# Patient Record
Sex: MALE | Race: BLACK OR AFRICAN AMERICAN | NOT HISPANIC OR LATINO | Employment: UNEMPLOYED | URBAN - METROPOLITAN AREA
[De-identification: names, ages, dates, MRNs, and addresses within clinical notes are randomized per-mention and may not be internally consistent; named-entity substitution may affect disease eponyms.]

---

## 2019-04-18 ENCOUNTER — HOSPITAL ENCOUNTER (EMERGENCY)
Facility: HOSPITAL | Age: 34
Discharge: HOME | End: 2019-04-18
Attending: EMERGENCY MEDICINE | Admitting: EMERGENCY MEDICINE

## 2019-04-18 ENCOUNTER — APPOINTMENT (OUTPATIENT)
Dept: GENERAL RADIOLOGY | Facility: HOSPITAL | Age: 34
End: 2019-04-18

## 2019-04-18 VITALS
RESPIRATION RATE: 18 BRPM | SYSTOLIC BLOOD PRESSURE: 147 MMHG | DIASTOLIC BLOOD PRESSURE: 88 MMHG | WEIGHT: 215 LBS | TEMPERATURE: 97.7 F | OXYGEN SATURATION: 98 % | HEIGHT: 72 IN | HEART RATE: 75 BPM | BODY MASS INDEX: 29.12 KG/M2

## 2019-04-18 DIAGNOSIS — S80.812A ABRASION OF LEFT LOWER EXTREMITY, INITIAL ENCOUNTER: ICD-10-CM

## 2019-04-18 DIAGNOSIS — S40.012A CONTUSION OF LEFT SHOULDER, INITIAL ENCOUNTER: Primary | ICD-10-CM

## 2019-04-18 PROCEDURE — 73030 X-RAY EXAM OF SHOULDER: CPT

## 2019-04-18 PROCEDURE — 99282 EMERGENCY DEPT VISIT SF MDM: CPT

## 2019-04-18 RX ORDER — ALBUTEROL SULFATE 90 UG/1
2 AEROSOL, METERED RESPIRATORY (INHALATION) EVERY 4 HOURS PRN
COMMUNITY

## 2019-04-18 RX ORDER — NAPROXEN 500 MG/1
500 TABLET ORAL 2 TIMES DAILY WITH MEALS
Qty: 14 TABLET | Refills: 0 | Status: SHIPPED | OUTPATIENT
Start: 2019-04-18 | End: 2019-05-10 | Stop reason: HOSPADM

## 2019-05-07 ENCOUNTER — APPOINTMENT (OUTPATIENT)
Dept: GENERAL RADIOLOGY | Facility: HOSPITAL | Age: 34
End: 2019-05-07

## 2019-05-07 ENCOUNTER — HOSPITAL ENCOUNTER (EMERGENCY)
Facility: HOSPITAL | Age: 34
Discharge: HOME | End: 2019-05-07
Attending: EMERGENCY MEDICINE | Admitting: EMERGENCY MEDICINE

## 2019-05-07 VITALS
DIASTOLIC BLOOD PRESSURE: 104 MMHG | RESPIRATION RATE: 16 BRPM | SYSTOLIC BLOOD PRESSURE: 169 MMHG | HEIGHT: 72 IN | TEMPERATURE: 99.3 F | HEART RATE: 100 BPM | OXYGEN SATURATION: 98 % | BODY MASS INDEX: 29.8 KG/M2 | WEIGHT: 220 LBS

## 2019-05-07 DIAGNOSIS — S80.12XA CONTUSION OF LEFT LOWER EXTREMITY, INITIAL ENCOUNTER: ICD-10-CM

## 2019-05-07 DIAGNOSIS — S92.512A CLOSED DISPLACED FRACTURE OF PROXIMAL PHALANX OF LESSER TOE OF LEFT FOOT, INITIAL ENCOUNTER: Primary | ICD-10-CM

## 2019-05-07 PROCEDURE — 73630 X-RAY EXAM OF FOOT: CPT

## 2019-05-07 PROCEDURE — 99283 EMERGENCY DEPT VISIT LOW MDM: CPT

## 2019-05-07 PROCEDURE — 73590 X-RAY EXAM OF LOWER LEG: CPT

## 2019-05-07 RX ORDER — TRAMADOL HYDROCHLORIDE 50 MG/1
50 TABLET ORAL EVERY 6 HOURS PRN
Qty: 15 TABLET | Refills: 0 | Status: SHIPPED | OUTPATIENT
Start: 2019-05-07 | End: 2019-05-10 | Stop reason: HOSPADM

## 2019-05-07 NOTE — ED TRIAGE NOTES
"Pt reports \"I want a second opinion.\" \"I have a broken toe on my left foot and I want to get the process started.\" Pt reports he injured his toe at work in April.   "

## 2019-05-07 NOTE — ED PROVIDER NOTES
EMERGENCY DEPARTMENT ENCOUNTER    CHIEF COMPLAINT  Chief Complaint: toe pain  History given by: patient  History limited by: nothing  Room Number: 07/07  PMD: Provider, No Known      HPI:  Pt is a 34 y.o. male who presents complaining of left fifth toe pain that began 4/16/19 when Pt was in an accident at work. Pt states that he had to jump off a machine to avoid being crushed by a machine and he fell onto his left side. He has had pain in his toe ever since. He had an XR about a week later that showed that his toe was fractured. His swelling has not improved and he continues to have pain. He also continues to have pain over his anterior left shin from an injury that occurred around the same time. He has no other complaints at this time.     Duration: three weeks  Onset: sudden  Timing: constant  Location: left toe  Radiation: none  Quality: pain  Intensity/Severity: moderate  Progression: unchanged  Associated Symptoms: pain over anterior left shin  Aggravating Factors: movement, weight bearing  Alleviating Factors: positional rest  Previous Episodes: none  Treatment before arrival: none    PAST MEDICAL HISTORY  Active Ambulatory Problems     Diagnosis Date Noted   • No Active Ambulatory Problems     Resolved Ambulatory Problems     Diagnosis Date Noted   • No Resolved Ambulatory Problems     No Additional Past Medical History       PAST SURGICAL HISTORY  Past Surgical History:   Procedure Laterality Date   • SHOULDER ARTHROSCOPY Left        FAMILY HISTORY  History reviewed. No pertinent family history.    SOCIAL HISTORY  Social History     Socioeconomic History   • Marital status: Single     Spouse name: Not on file   • Number of children: Not on file   • Years of education: Not on file   • Highest education level: Not on file   Tobacco Use   • Smoking status: Current Some Day Smoker     Packs/day: 0.50     Types: Cigarettes   • Smokeless tobacco: Never Used   Substance and Sexual Activity   • Alcohol use: No      Frequency: Never   • Drug use: No   • Sexual activity: Defer       ALLERGIES  Patient has no known allergies.    REVIEW OF SYSTEMS  Review of Systems   Constitutional: Negative for fever.   Respiratory: Negative for shortness of breath.    Cardiovascular: Negative for chest pain.   Musculoskeletal: Positive for arthralgias (left fifth toe).        Pain over anterior left shin   All other systems reviewed and are negative.      PHYSICAL EXAM  ED Triage Vitals   Temp Heart Rate Resp BP SpO2   05/07/19 1400 05/07/19 1400 05/07/19 1400 05/07/19 1528 05/07/19 1400   99.3 °F (37.4 °C) 100 16 (!) 169/104 98 %      Temp src Heart Rate Source Patient Position BP Location FiO2 (%)   -- -- 05/07/19 1528 -- --     Sitting         Physical Exam   Constitutional: No distress.   HENT:   Head: Normocephalic and atraumatic.   Eyes: EOM are normal.   Neck: Normal range of motion.   Pulmonary/Chest: No respiratory distress.   CTAB   Abdominal: There is no tenderness.   Musculoskeletal: He exhibits no edema.   Tenderness and swelling over left anterior mid leg. Tenderness and swelling to left fifth toe.   Neurological: He is alert.   Skin: Skin is warm and dry.   Nursing note and vitals reviewed.        RADIOLOGY  XR Tibia Fibula 2 View Left   Final Result       Fracture of the fifth proximal phalanx.       This report was finalized on 5/7/2019 3:25 PM by Dr. Terry Lombardi M.D.          XR Foot 3+ View Left   Final Result       Fracture of the fifth proximal phalanx.       This report was finalized on 5/7/2019 3:25 PM by Dr. Terry Lombardi M.D.               I ordered the above noted radiological studies. Interpreted by radiologist.  Reviewed by me in PACS.       PROCEDURES  Procedures      PROGRESS AND CONSULTS     1450  Ordered XR left tib/fib and XR left foot for further evaluation.     1518  Rechecked Pt who is resting comfortably. Informed him that his left fifth toe is fractured and may require a pin but he will need  to follow up with an orthopedist. We will discharge him with a walking boot and crutches. Pt understands and agrees to all plans. All questions answered.       MEDICAL DECISION MAKING  Results were reviewed/discussed with the patient and they were also made aware of online access. Pt also made aware that some labs, such as cultures, will not be resulted during ER visit and follow up with PMD is necessary.     MDM  Number of Diagnoses or Management Options     Amount and/or Complexity of Data Reviewed  Tests in the radiology section of CPT®: ordered and reviewed (XR let tib/fib shows nothing acute. XR left foot shows a left fifth toe fracture.)           DIAGNOSIS  Final diagnoses:   Closed displaced fracture of proximal phalanx of lesser toe of left foot, initial encounter   Contusion of left lower extremity, initial encounter       DISPOSITION  DISCHARGE    Patient discharged in stable condition.    Reviewed implications of results, diagnosis, meds, responsibility to follow up, warning signs and symptoms of possible worsening, potential complications and reasons to return to ER, including new or worsening symptoms.    Patient/Family voiced understanding of above instructions.    Discussed plan for discharge, as there is no emergent indication for admission. Patient referred to primary care provider for BP management due to today's BP. Pt/family is agreeable and understands need for follow up and repeat testing.  Pt is aware that discharge does not mean that nothing is wrong but it indicates no emergency is present that requires admission and they must continue care with follow-up as given below or physician of their choice.     FOLLOW-UP  Dee Stewart MD  [address] [address] [phone]    Schedule an appointment as soon as possible for a visit            Medication List      No changes were made to your prescriptions during this visit.           Latest Documented Vital Signs:  As  of 3:30 PM  BP- (!) 169/104 HR- 100 Temp- 99.3 °F (37.4 °C) O2 sat- 98%    --  Documentation assistance provided by bulmaro Villatoro for Dr. Gramajo.  Information recorded by the scribe was done at my direction and has been verified and validated by me.     Teri Villatoro  05/07/19 [id number]       Edgar Gramajo MD  05/07/19 9514

## 2019-05-07 NOTE — DISCHARGE INSTRUCTIONS
Ice and elevate your foot when at rest.  You do need to see an orthopedist so they can determine whether or not you need surgery.  Please call Dr. Stewart for an appointment.  Use over-the-counter Tylenol or ibuprofen as needed for pain.

## 2019-05-09 ENCOUNTER — TELEPHONE (OUTPATIENT)
Dept: ORTHOPEDIC SURGERY | Facility: CLINIC | Age: 34
End: 2019-05-09

## 2019-05-09 ENCOUNTER — OFFICE VISIT (OUTPATIENT)
Dept: ORTHOPEDIC SURGERY | Facility: CLINIC | Age: 34
End: 2019-05-09

## 2019-05-09 VITALS — BODY MASS INDEX: 29.8 KG/M2 | HEIGHT: 72 IN | WEIGHT: 220 LBS | TEMPERATURE: 98.2 F

## 2019-05-09 DIAGNOSIS — S92.512A CLOSED DISPLACED FRACTURE OF PROXIMAL PHALANX OF LESSER TOE OF LEFT FOOT, INITIAL ENCOUNTER: Primary | ICD-10-CM

## 2019-05-09 DIAGNOSIS — S99.922A TOE INJURY, LEFT, INITIAL ENCOUNTER: ICD-10-CM

## 2019-05-09 DIAGNOSIS — Z72.0 TOBACCO ABUSE: ICD-10-CM

## 2019-05-09 PROCEDURE — 99406 BEHAV CHNG SMOKING 3-10 MIN: CPT | Performed by: ORTHOPAEDIC SURGERY

## 2019-05-09 PROCEDURE — 73660 X-RAY EXAM OF TOE(S): CPT | Performed by: ORTHOPAEDIC SURGERY

## 2019-05-09 PROCEDURE — 99204 OFFICE O/P NEW MOD 45 MIN: CPT | Performed by: ORTHOPAEDIC SURGERY

## 2019-05-09 NOTE — H&P
"   Patient: Bronson Phillips  YOB: 1985 34 y.o. male  Medical Record Number: [id number]     Chief Complaints: I hurt my toe     History of Present Illness: Patient injured his left fifth toe on 4/26/2019 when he was at work and had to jump off of some type of machine to keep from \"being crushed.     He was seen and has been followed elsewhere for his left shoulder and left fifth toe but was told he may need surgery for the foot and was waiting on some type of referral says that he became \"frustrated\" and went to the ER for further evaluation.  He was seen and examined there and referred here today for further evaluation.       He reports a severe constant crushing stabbing aching pain with redness bruising and swelling in his left fifth toe worse with standing driving and walking improved with ice.  He has been using the boot but no other assistive device.        HPI     Allergies: No Known Allergies     Medications:        Current Outpatient Medications on File Prior to Visit   Medication Sig   • albuterol sulfate  (90 Base) MCG/ACT inhaler Inhale 2 puffs Every 4 (Four) Hours As Needed for Wheezing.   • fluticasone-salmeterol (ADVAIR HFA) 115-21 MCG/ACT inhaler Inhale 2 puffs 2 (Two) Times a Day.   • naproxen (EC NAPROSYN) 500 MG EC tablet Take 1 tablet by mouth 2 (Two) Times a Day With Meals.   • traMADol (ULTRAM) 50 MG tablet Take 1 tablet by mouth Every 6 (Six) Hours As Needed for Moderate Pain .      No current facility-administered medications on file prior to visit.          Medical History   History reviewed. No pertinent past medical history.     Surgical History         Past Surgical History:   Procedure Laterality Date   • SHOULDER ARTHROSCOPY Left           Social History      Occupational History   • Not on file   Tobacco Use   • Smoking status: Current Some Day Smoker       Packs/day: 0.50       Types: Cigarettes   • Smokeless tobacco: Never Used   Substance and Sexual Activity   • " "Alcohol use: No       Frequency: Never   • Drug use: No   • Sexual activity: Defer      Social History          Social History Narrative   • Not on file      History reviewed. No pertinent family history.     Review of Systems: 14 point review of systems performed, positive pertinent findings identified in HPI. All remaining systems negative except wheezing, numbness and tingling, mood swings, anxiety depression, difficulty sleeping     Review of Systems        Physical Exam:   Vitals       Vitals:     05/09/19 0903   Temp: 98.2 °F (36.8 °C)   Weight: 99.8 kg (220 lb)   Height: 182.9 cm (72\")         Physical Exam   Constitutional: pleasant, well developed   Eyes: sclera non icteric  Hearing : adequate for exam  Cardiovascular: palpable pulses in left foot, left calf/ thigh NT without sign of DVT  Respiratoy: breathing unlabored   Neurological: grossly sensate to LT throughout left LE  Psychiatric: oriented with normal mood and affect.   Lymphatic: No palpable popliteal lymphadenopathy left LE  Skin: intact throughout left leg/foot  Musculoskeletal: Left foot shows mild to moderate swelling of the left fifth toe with some palpable prominence dorsally with exquisite tenderness to palpation.  There is brisk capillary refill to the toe but it was grossly sensate to light touch.  He is nontender over the ankle and was ablating in a boot without assistive device.  Physical Exam  Ortho Exam     Radiology: 3 views of the left fifth toe ordered to evaluate pain reviewed and compared to x-rays on the [facility] system from 5/7/2019.  There is a transverse slightly oblique fracture through the midportion of the left fifth proximal phalanx.  There is not appreciable angulation but there is dorsal translation of about 80% the diameter of the phalanx.     Assessment/Plan: 1.  Displaced fracture left fifth toe proximal phalanx.     Reviewed with him operative and nonoperative treatment options and given the fact that is been about 3 " and half weeks since his injury he has not had any improvement in pain he would like to proceed with operative treatment although no guarantees could be given.  Reviewed with him that this could potentially heal without surgery but given the amount of translation there may be persistent residual deformity or it may not heal.  Certainly could not guarantee with surgery that he would be pain-free and reviewed with him that given the length of time since this injury that this would require open treatment rather than closed and percutaneous pinning.     I reviewed the operative procedure and postoperative course with him and that he will need to be limited in weightbearing with at least use of a cane in the right hand as he cannot use crutches because of some type of injury in his left shoulder that he is being seen elsewhere for.     Risk benefits potential outcomes and complications were reviewed and can include but not limited to heart attack stroke death pneumonia infection bleeding damage to blood vessels nerves or tendons blood clots pulmonary embolism persistent or worsening pain stiffness malunion nonunion and loss of the toe.  All his questions answered to his full satisfaction we will plan to proceed with this on outpatient basis tomorrow.     Regarding persistent habitual smoking.  I have discussed for at least 3 minutes with the patient the ill effects of continued smoking on pulmonary and cardiovascular health as well as financial burden.  I also discussed at length the effects on peripheral circulation as well as inhibition of soft tissue and bone healing.  I've recommended that they speak with their primary care physician regarding cessation techniques.        This will be done under his regular insurance although there is a Workmen's Compensation claim it was investigated by our office staff and the claim is being denied and patient has been terminated.  He was made aware of all this during his  visit

## 2019-05-10 ENCOUNTER — HOSPITAL ENCOUNTER (OUTPATIENT)
Facility: HOSPITAL | Age: 34
Setting detail: HOSPITAL OUTPATIENT SURGERY
Discharge: HOME | End: 2019-05-10
Attending: ORTHOPAEDIC SURGERY | Admitting: ORTHOPAEDIC SURGERY

## 2019-05-10 ENCOUNTER — ANESTHESIA (OUTPATIENT)
Dept: PERIOP | Facility: HOSPITAL | Age: 34
End: 2019-05-10

## 2019-05-10 ENCOUNTER — APPOINTMENT (OUTPATIENT)
Dept: GENERAL RADIOLOGY | Facility: HOSPITAL | Age: 34
End: 2019-05-10

## 2019-05-10 ENCOUNTER — ANESTHESIA EVENT (OUTPATIENT)
Dept: PERIOP | Facility: HOSPITAL | Age: 34
End: 2019-05-10

## 2019-05-10 VITALS
SYSTOLIC BLOOD PRESSURE: 151 MMHG | RESPIRATION RATE: 18 BRPM | HEART RATE: 88 BPM | DIASTOLIC BLOOD PRESSURE: 95 MMHG | TEMPERATURE: 97.9 F | OXYGEN SATURATION: 96 %

## 2019-05-10 DIAGNOSIS — S92.512A CLOSED DISPLACED FRACTURE OF PROXIMAL PHALANX OF LESSER TOE OF LEFT FOOT, INITIAL ENCOUNTER: ICD-10-CM

## 2019-05-10 PROCEDURE — 76000 FLUOROSCOPY <1 HR PHYS/QHP: CPT

## 2019-05-10 PROCEDURE — 25010000002 FENTANYL CITRATE (PF) 100 MCG/2ML SOLUTION: Performed by: NURSE ANESTHETIST, CERTIFIED REGISTERED

## 2019-05-10 PROCEDURE — 25010000002 HYDRALAZINE PER 20 MG: Performed by: ANESTHESIOLOGY

## 2019-05-10 PROCEDURE — 25010000002 FENTANYL CITRATE (PF) 100 MCG/2ML SOLUTION: Performed by: ANESTHESIOLOGY

## 2019-05-10 PROCEDURE — 25010000002 DEXAMETHASONE PER 1 MG: Performed by: NURSE ANESTHETIST, CERTIFIED REGISTERED

## 2019-05-10 PROCEDURE — 73620 X-RAY EXAM OF FOOT: CPT

## 2019-05-10 PROCEDURE — 94640 AIRWAY INHALATION TREATMENT: CPT

## 2019-05-10 PROCEDURE — 28525 TREAT TOE FRACTURE: CPT | Performed by: ORTHOPAEDIC SURGERY

## 2019-05-10 PROCEDURE — 94799 UNLISTED PULMONARY SVC/PX: CPT

## 2019-05-10 PROCEDURE — C1713 ANCHOR/SCREW BN/BN,TIS/BN: HCPCS | Performed by: ORTHOPAEDIC SURGERY

## 2019-05-10 PROCEDURE — 25010000002 ONDANSETRON PER 1 MG: Performed by: NURSE ANESTHETIST, CERTIFIED REGISTERED

## 2019-05-10 PROCEDURE — 25010000002 MIDAZOLAM PER 1 MG: Performed by: ANESTHESIOLOGY

## 2019-05-10 PROCEDURE — 25010000003 CEFAZOLIN IN DEXTROSE 2-4 GM/100ML-% SOLUTION: Performed by: ORTHOPAEDIC SURGERY

## 2019-05-10 PROCEDURE — 25010000002 PROPOFOL 10 MG/ML EMULSION: Performed by: NURSE ANESTHETIST, CERTIFIED REGISTERED

## 2019-05-10 PROCEDURE — 25010000002 KETOROLAC TROMETHAMINE PER 15 MG: Performed by: NURSE ANESTHETIST, CERTIFIED REGISTERED

## 2019-05-10 DEVICE — KWIRE SMOTH DBL/TROC .062X4IN: Type: IMPLANTABLE DEVICE | Site: TOE FIFTH | Status: FUNCTIONAL

## 2019-05-10 RX ORDER — OXYCODONE HYDROCHLORIDE AND ACETAMINOPHEN 5; 325 MG/1; MG/1
1-2 TABLET ORAL EVERY 6 HOURS PRN
Qty: 50 TABLET | Refills: 0 | OUTPATIENT
Start: 2019-05-10 | End: 2019-08-23

## 2019-05-10 RX ORDER — ACETAMINOPHEN 650 MG
TABLET, EXTENDED RELEASE ORAL AS NEEDED
Status: DISCONTINUED | OUTPATIENT
Start: 2019-05-10 | End: 2019-05-10 | Stop reason: HOSPADM

## 2019-05-10 RX ORDER — MAGNESIUM HYDROXIDE 1200 MG/15ML
LIQUID ORAL AS NEEDED
Status: DISCONTINUED | OUTPATIENT
Start: 2019-05-10 | End: 2019-05-10 | Stop reason: HOSPADM

## 2019-05-10 RX ORDER — ALBUTEROL SULFATE 2.5 MG/3ML
2.5 SOLUTION RESPIRATORY (INHALATION) ONCE
Status: COMPLETED | OUTPATIENT
Start: 2019-05-10 | End: 2019-05-10

## 2019-05-10 RX ORDER — NALBUPHINE HCL 10 MG/ML
10 AMPUL (ML) INJECTION EVERY 4 HOURS PRN
Status: DISCONTINUED | OUTPATIENT
Start: 2019-05-10 | End: 2019-05-10 | Stop reason: HOSPADM

## 2019-05-10 RX ORDER — OXYCODONE HYDROCHLORIDE AND ACETAMINOPHEN 5; 325 MG/1; MG/1
1 TABLET ORAL ONCE AS NEEDED
Status: DISCONTINUED | OUTPATIENT
Start: 2019-05-10 | End: 2019-05-10 | Stop reason: HOSPADM

## 2019-05-10 RX ORDER — ACETAMINOPHEN 650 MG/1
650 SUPPOSITORY RECTAL ONCE AS NEEDED
Status: DISCONTINUED | OUTPATIENT
Start: 2019-05-10 | End: 2019-05-10 | Stop reason: HOSPADM

## 2019-05-10 RX ORDER — PROMETHAZINE HYDROCHLORIDE 25 MG/1
25 TABLET ORAL ONCE AS NEEDED
Status: DISCONTINUED | OUTPATIENT
Start: 2019-05-10 | End: 2019-05-10 | Stop reason: HOSPADM

## 2019-05-10 RX ORDER — LIDOCAINE HYDROCHLORIDE 10 MG/ML
0.5 INJECTION, SOLUTION EPIDURAL; INFILTRATION; INTRACAUDAL; PERINEURAL ONCE AS NEEDED
Status: DISCONTINUED | OUTPATIENT
Start: 2019-05-10 | End: 2019-05-10 | Stop reason: HOSPADM

## 2019-05-10 RX ORDER — HYDROCODONE BITARTRATE AND ACETAMINOPHEN 5; 325 MG/1; MG/1
1 TABLET ORAL ONCE AS NEEDED
Status: DISCONTINUED | OUTPATIENT
Start: 2019-05-10 | End: 2019-05-10 | Stop reason: HOSPADM

## 2019-05-10 RX ORDER — CEPHALEXIN 500 MG/1
500 CAPSULE ORAL EVERY 6 HOURS
Qty: 8 CAPSULE | Refills: 0 | Status: SHIPPED | OUTPATIENT
Start: 2019-05-10 | End: 2019-05-12

## 2019-05-10 RX ORDER — MIDAZOLAM HYDROCHLORIDE 1 MG/ML
2 INJECTION INTRAMUSCULAR; INTRAVENOUS
Status: DISCONTINUED | OUTPATIENT
Start: 2019-05-10 | End: 2019-05-10 | Stop reason: HOSPADM

## 2019-05-10 RX ORDER — MIDAZOLAM HYDROCHLORIDE 1 MG/ML
1 INJECTION INTRAMUSCULAR; INTRAVENOUS
Status: DISCONTINUED | OUTPATIENT
Start: 2019-05-10 | End: 2019-05-10 | Stop reason: HOSPADM

## 2019-05-10 RX ORDER — ACETAMINOPHEN 500 MG
500 TABLET ORAL ONCE
Status: COMPLETED | OUTPATIENT
Start: 2019-05-10 | End: 2019-05-10

## 2019-05-10 RX ORDER — NALOXONE HCL 0.4 MG/ML
0.4 VIAL (ML) INJECTION AS NEEDED
Status: DISCONTINUED | OUTPATIENT
Start: 2019-05-10 | End: 2019-05-10 | Stop reason: HOSPADM

## 2019-05-10 RX ORDER — DIPHENHYDRAMINE HYDROCHLORIDE 50 MG/ML
12.5 INJECTION INTRAMUSCULAR; INTRAVENOUS
Status: DISCONTINUED | OUTPATIENT
Start: 2019-05-10 | End: 2019-05-10 | Stop reason: HOSPADM

## 2019-05-10 RX ORDER — SODIUM CHLORIDE 0.9 % (FLUSH) 0.9 %
3 SYRINGE (ML) INJECTION EVERY 12 HOURS SCHEDULED
Status: DISCONTINUED | OUTPATIENT
Start: 2019-05-10 | End: 2019-05-10 | Stop reason: HOSPADM

## 2019-05-10 RX ORDER — ACETAMINOPHEN 325 MG/1
650 TABLET ORAL ONCE AS NEEDED
Status: DISCONTINUED | OUTPATIENT
Start: 2019-05-10 | End: 2019-05-10 | Stop reason: HOSPADM

## 2019-05-10 RX ORDER — CEFAZOLIN SODIUM 2 G/100ML
2 INJECTION, SOLUTION INTRAVENOUS ONCE
Status: COMPLETED | OUTPATIENT
Start: 2019-05-10 | End: 2019-05-10

## 2019-05-10 RX ORDER — PROMETHAZINE HYDROCHLORIDE 25 MG/ML
6.25 INJECTION, SOLUTION INTRAMUSCULAR; INTRAVENOUS ONCE AS NEEDED
Status: DISCONTINUED | OUTPATIENT
Start: 2019-05-10 | End: 2019-05-10 | Stop reason: HOSPADM

## 2019-05-10 RX ORDER — FENTANYL CITRATE 50 UG/ML
INJECTION, SOLUTION INTRAMUSCULAR; INTRAVENOUS AS NEEDED
Status: DISCONTINUED | OUTPATIENT
Start: 2019-05-10 | End: 2019-05-10 | Stop reason: SURG

## 2019-05-10 RX ORDER — ONDANSETRON 2 MG/ML
INJECTION INTRAMUSCULAR; INTRAVENOUS AS NEEDED
Status: DISCONTINUED | OUTPATIENT
Start: 2019-05-10 | End: 2019-05-10 | Stop reason: SURG

## 2019-05-10 RX ORDER — NALBUPHINE HCL 10 MG/ML
2 AMPUL (ML) INJECTION EVERY 4 HOURS PRN
Status: DISCONTINUED | OUTPATIENT
Start: 2019-05-10 | End: 2019-05-10 | Stop reason: HOSPADM

## 2019-05-10 RX ORDER — ACETAMINOPHEN 500 MG
500 TABLET ORAL ONCE
Status: DISCONTINUED | OUTPATIENT
Start: 2019-05-10 | End: 2019-05-10 | Stop reason: HOSPADM

## 2019-05-10 RX ORDER — SODIUM CHLORIDE 0.9 % (FLUSH) 0.9 %
1-10 SYRINGE (ML) INJECTION AS NEEDED
Status: DISCONTINUED | OUTPATIENT
Start: 2019-05-10 | End: 2019-05-10 | Stop reason: HOSPADM

## 2019-05-10 RX ORDER — BUPIVACAINE HYDROCHLORIDE 5 MG/ML
INJECTION, SOLUTION EPIDURAL; INTRACAUDAL AS NEEDED
Status: DISCONTINUED | OUTPATIENT
Start: 2019-05-10 | End: 2019-05-10 | Stop reason: HOSPADM

## 2019-05-10 RX ORDER — PROMETHAZINE HYDROCHLORIDE 25 MG/1
25 SUPPOSITORY RECTAL ONCE AS NEEDED
Status: DISCONTINUED | OUTPATIENT
Start: 2019-05-10 | End: 2019-05-10 | Stop reason: HOSPADM

## 2019-05-10 RX ORDER — KETOROLAC TROMETHAMINE 30 MG/ML
INJECTION, SOLUTION INTRAMUSCULAR; INTRAVENOUS AS NEEDED
Status: DISCONTINUED | OUTPATIENT
Start: 2019-05-10 | End: 2019-05-10 | Stop reason: SURG

## 2019-05-10 RX ORDER — FENTANYL CITRATE 50 UG/ML
50 INJECTION, SOLUTION INTRAMUSCULAR; INTRAVENOUS
Status: DISCONTINUED | OUTPATIENT
Start: 2019-05-10 | End: 2019-05-10 | Stop reason: HOSPADM

## 2019-05-10 RX ORDER — SODIUM CHLORIDE, SODIUM LACTATE, POTASSIUM CHLORIDE, CALCIUM CHLORIDE 600; 310; 30; 20 MG/100ML; MG/100ML; MG/100ML; MG/100ML
9 INJECTION, SOLUTION INTRAVENOUS CONTINUOUS
Status: DISCONTINUED | OUTPATIENT
Start: 2019-05-10 | End: 2019-05-10 | Stop reason: HOSPADM

## 2019-05-10 RX ORDER — HYDROMORPHONE HYDROCHLORIDE 1 MG/ML
0.5 INJECTION, SOLUTION INTRAMUSCULAR; INTRAVENOUS; SUBCUTANEOUS
Status: DISCONTINUED | OUTPATIENT
Start: 2019-05-10 | End: 2019-05-10 | Stop reason: HOSPADM

## 2019-05-10 RX ORDER — LIDOCAINE HYDROCHLORIDE 20 MG/ML
INJECTION, SOLUTION INFILTRATION; PERINEURAL AS NEEDED
Status: DISCONTINUED | OUTPATIENT
Start: 2019-05-10 | End: 2019-05-10 | Stop reason: SURG

## 2019-05-10 RX ORDER — HYDRALAZINE HYDROCHLORIDE 20 MG/ML
5 INJECTION INTRAMUSCULAR; INTRAVENOUS
Status: DISCONTINUED | OUTPATIENT
Start: 2019-05-10 | End: 2019-05-10 | Stop reason: HOSPADM

## 2019-05-10 RX ORDER — PROPOFOL 10 MG/ML
VIAL (ML) INTRAVENOUS AS NEEDED
Status: DISCONTINUED | OUTPATIENT
Start: 2019-05-10 | End: 2019-05-10 | Stop reason: SURG

## 2019-05-10 RX ORDER — DEXAMETHASONE SODIUM PHOSPHATE 10 MG/ML
INJECTION INTRAMUSCULAR; INTRAVENOUS AS NEEDED
Status: DISCONTINUED | OUTPATIENT
Start: 2019-05-10 | End: 2019-05-10 | Stop reason: SURG

## 2019-05-10 RX ADMIN — FENTANYL CITRATE 50 MCG: 50 INJECTION INTRAMUSCULAR; INTRAVENOUS at 12:43

## 2019-05-10 RX ADMIN — LIDOCAINE HYDROCHLORIDE 100 MG: 20 INJECTION, SOLUTION INFILTRATION; PERINEURAL at 12:12

## 2019-05-10 RX ADMIN — SODIUM CHLORIDE, POTASSIUM CHLORIDE, SODIUM LACTATE AND CALCIUM CHLORIDE: 600; 310; 30; 20 INJECTION, SOLUTION INTRAVENOUS at 13:12

## 2019-05-10 RX ADMIN — OXYCODONE AND ACETAMINOPHEN 1 TABLET: 5; 325 TABLET ORAL at 14:05

## 2019-05-10 RX ADMIN — FENTANYL CITRATE 50 MCG: 50 INJECTION INTRAMUSCULAR; INTRAVENOUS at 12:46

## 2019-05-10 RX ADMIN — FENTANYL CITRATE 100 MCG: 50 INJECTION INTRAMUSCULAR; INTRAVENOUS at 12:12

## 2019-05-10 RX ADMIN — DEXAMETHASONE SODIUM PHOSPHATE 8 MG: 10 INJECTION INTRAMUSCULAR; INTRAVENOUS at 12:20

## 2019-05-10 RX ADMIN — ONDANSETRON 4 MG: 2 INJECTION INTRAMUSCULAR; INTRAVENOUS at 12:56

## 2019-05-10 RX ADMIN — HYDRALAZINE HYDROCHLORIDE 5 MG: 20 INJECTION INTRAMUSCULAR; INTRAVENOUS at 13:47

## 2019-05-10 RX ADMIN — FENTANYL CITRATE 50 MCG: 50 INJECTION, SOLUTION INTRAMUSCULAR; INTRAVENOUS at 14:04

## 2019-05-10 RX ADMIN — KETOROLAC TROMETHAMINE 30 MG: 30 INJECTION, SOLUTION INTRAMUSCULAR; INTRAVENOUS at 13:12

## 2019-05-10 RX ADMIN — CEFAZOLIN SODIUM 2 G: 2 INJECTION, SOLUTION INTRAVENOUS at 12:15

## 2019-05-10 RX ADMIN — HYDRALAZINE HYDROCHLORIDE 10 MG: 20 INJECTION INTRAMUSCULAR; INTRAVENOUS at 13:37

## 2019-05-10 RX ADMIN — FENTANYL CITRATE 50 MCG: 50 INJECTION, SOLUTION INTRAMUSCULAR; INTRAVENOUS at 13:46

## 2019-05-10 RX ADMIN — ALBUTEROL SULFATE 2.5 MG: 2.5 SOLUTION RESPIRATORY (INHALATION) at 11:29

## 2019-05-10 RX ADMIN — SODIUM CHLORIDE, POTASSIUM CHLORIDE, SODIUM LACTATE AND CALCIUM CHLORIDE: 600; 310; 30; 20 INJECTION, SOLUTION INTRAVENOUS at 11:53

## 2019-05-10 RX ADMIN — MIDAZOLAM 2 MG: 1 INJECTION INTRAMUSCULAR; INTRAVENOUS at 11:37

## 2019-05-10 RX ADMIN — HYDRALAZINE HYDROCHLORIDE 5 MG: 20 INJECTION INTRAMUSCULAR; INTRAVENOUS at 13:56

## 2019-05-10 RX ADMIN — PROPOFOL 200 MG: 10 INJECTION, EMULSION INTRAVENOUS at 12:12

## 2019-05-10 RX ADMIN — ACETAMINOPHEN 500 MG: 500 TABLET, FILM COATED ORAL at 11:36

## 2019-05-10 NOTE — NURSING NOTE
Patient wanting out of hospital.  No distress noted.  No problems noted with dressing.  Post op shoe on and pin wnl.  Wheeled directly to the car and assisted in car without bearing weight.

## 2019-05-10 NOTE — BRIEF OP NOTE
TOE OPEN REDUCTION INTERNAL FIXATION  Progress Note    Bronson Phillips  5/10/2019    Pre-op Diagnosis:   Closed displaced fracture of proximal phalanx of lesser toe of left foot, initial encounter [S92.512A]       Post-Op Diagnosis Codes:     * Closed displaced fracture of proximal phalanx of lesser toe of left foot, initial encounter [S92.512A]    Procedure/CPT® Codes:      Procedure(s):  left fifthTOE OPEN REDUCTION INTERNAL FIXATION    Surgeon(s):  Angel Castillo MD    Anesthesia: General    Staff:   Circulator: Mary Moses RN  Radiology Technologist: Angel Ch  Scrub Person: Nellie Barreto    Estimated Blood Loss: minimal    Urine Voided: * No values recorded between 5/10/2019 12:05 PM and 5/10/2019  1:18 PM *    Specimens:                None    TT 37min      Drains: none     Findings: see dict    Complications: none      Angel Castillo MD     Date: 5/10/2019  Time: 1:19 PM

## 2019-05-10 NOTE — ANESTHESIA PROCEDURE NOTES
Airway  Urgency: elective    Airway not difficult    General Information and Staff    Patient location during procedure: OR  Anesthesiologist: Nick Rocha MD  CRNA: Jennifer Mcdermott CRNA    Indications and Patient Condition  Indications for airway management: airway protection    Preoxygenated: yes  MILS not maintained throughout  Mask difficulty assessment: 1 - vent by mask    Final Airway Details  Final airway type: supraglottic airway      Successful airway: classic  Size 5    Number of attempts at approach: 1    Additional Comments  Inflated to seal.

## 2019-05-10 NOTE — OP NOTE
Operative Note      Facility: [facility]  Patient Name: Bronson Phillips  YOB: 1985  Date: 5/10/2019  Medical Record Number: [id number]      Pre-op Diagnosis: 1.  Left fifth toe displaced transverse midshaft proximal phalangeal fracture      Post-op Diagnosis:   1.  Left fifth toe displaced transverse midshaft proximal phalangeal fracture        Procedure(s):  left fifthTOE OPEN REDUCTION INTERNAL FIXATION    Surgeon(s):  Angel Castillo MD    Anesthesia: General  Anesthesiologist: Nick Rocha MD  CRNA: Jennifer Mcdermott CRNA    Staff:   Circulator: Mary Moses RN  Radiology Technologist: Angel Ch  Scrub Person: Nellie Barreto  Assistants : none      Tourniquet time: 37 minutes        Estimated Blood Loss:  minimal  Drains: None  Specimens: * No orders in the log *  Findings: See Dictation    Complications: None      Indications for Procedure: Patient injured his left fifth toe on 4/16/2019 when he had a jump from some type of machinery to keep from being injured.  He has been seen and followed elsewhere and I initially saw him yesterday with persistent complaints of severe constant pain in the left fifth toe.  X-rays show significant displacement and recommended operative fixation.  He voiced a clear understanding of operative and nonoperative treatment options with decision made to proceed with operative treatment.  He voiced a clear understanding of risks benefits potential outcomes and complications as reviewed previously        Description of Procedure: Preoperative informed consent and anesthesia evaluation were obtained.  IV antibiotics were administered and the surgical site was marked.  Patient was brought to the operating room placed in the supine position.  Anesthesia was induced and LMA was positioned.  Well-padded tourniquet was placed left distal leg.  Left foot was prepped and draped in a sterile fashion and surgical timeout was  performed.    Left fifth toe was examined and manipulated radiographically with inability to reduce this in a closed fashion which I suspected would be the case given the time since his injury.    Left foot was exsanguinated and pneumatic tourniquet inflated to 200 mmHg.  The fracture was localized radiographically and incision was made over the proximal phalanx of the fifth toe.  Full-thickness flaps were elevated and the extensor tendon was identified.  It was adherent to the underlying bone with minimal excursion indicative of previous scarring from injury.  I decided it was best to split this in order to identify the fracture had adequately.  The tendon was divided longitudinally but kept intact both proximally and distally.  Full-thickness flaps were elevated and the proximal phalanx was exposed.  There was fibrotic material surrounding the fracture but no clear callus.  There was slight comminution over the dorsal proximal aspect of the distal fragment.  The bone had been shortened and soft tissues were contracted around this.  These were gently released and I was then able to use a rondure to clear fibrotic material from within the fracture beds.  Once these were better mobilized I then distracted and reduced the fracture and appeared to have adequate reduction of the difficult to key in anywhere due to comminution and time since injury.  The wound was irrigated with dilute Betadine solution and I then felt that a K wire was best to fix this given the size of the fragments.  A 0.062 mm K wire was passed out through the base of the distal fracture and the intramedullary canal out through the tip of the toe.  The fracture was then reduced and cross pinned.  Initially there was still too much flexion and the pin was backed out fractures were again mobilized and then again reduced and K wire was passed to the intramedullary canal of the proximal fragment and embedded proximally.  X-rays showed improved alignment  that was acceptable and there was good opposition of the fracture medially laterally and plantarly.  The area where there is been some comminution was not quite well opposed but this was unavoidable.  Overall there was a stable construct and no gross instability rotationally or with varus and valgus stress.  X-rays confirmed appropriate alignment and containment of the hardware.    The wound was again irrigated copiously with dilute Betadine solution the extensor tendon was then repaired with 4-0 Vicryl suture.  The tourniquet was released and hemostasis was obtained.  There was prompt return of capillary refill to the toes and the skin was closed with 4-0 Vicryl.  A regional field block was administered around the fifth toe proximally with a total of 8 cc 1/2% Marcaine without epinephrine.    Sterile dressings were applied after the pin was bent clipped and capped.  Forefoot and ankle bunion dressing was applied with gentle compression.  Postoperative shoe was placed.    He was awakened transferred to stretcher and taken recovery in stable condition

## 2019-05-10 NOTE — ANESTHESIA PREPROCEDURE EVALUATION
Anesthesia Evaluation     no history of anesthetic complications:  NPO Solid Status: > 8 hours             Airway   Mallampati: II  TM distance: >3 FB  Neck ROM: full  No difficulty expected  Dental - normal exam     Pulmonary - negative pulmonary ROS   (+) wheezes,     PE comment: R>L end expiratory wheezes  Cardiovascular - negative cardio ROS    Rhythm: regular    (-) murmur      Neuro/Psych- negative ROS  GI/Hepatic/Renal/Endo - negative ROS     Musculoskeletal (-) negative ROS    Abdominal    Substance History      OB/GYN          Other                        Anesthesia Plan    ASA 2     general   (  D/W R&B of GA including but not limited to: heart, lung, liver, kidney, neurologic problems, positioning injuries, dental damage, corneal abrasion and TMJ.  .    Pt wants GA)  Anesthetic plan, all risks, benefits, and alternatives have been provided, discussed and informed consent has been obtained with: patient.

## 2019-05-10 NOTE — ANESTHESIA POSTPROCEDURE EVALUATION
Patient: Bronson Phillips    Procedure Summary     Date:  05/10/19 Room / Location:   [facility]  [facility]    Anesthesia Start:  1205 Anesthesia Stop:  1332    Procedure:  left fifthTOE OPEN REDUCTION INTERNAL FIXATION (Left Toes) Diagnosis:       Closed displaced fracture of proximal phalanx of lesser toe of left foot, initial encounter      (Closed displaced fracture of proximal phalanx of lesser toe of left foot, initial encounter [S92.512A])    Surgeon:  Angel Castillo MD Provider:  Nick Rocha MD    Anesthesia Type:  general ASA Status:  2          Anesthesia Type: general  Last vitals  BP   146/99 (05/10/19 1420)   Temp   36.6 °C (97.9 °F) (05/10/19 1329)   Pulse   91 (05/10/19 1420)   Resp   16 (05/10/19 1420)     SpO2   98 % (05/10/19 1420)     Post Anesthesia Care and Evaluation    Patient location during evaluation: bedside  Patient participation: complete - patient participated  Level of consciousness: awake and alert  Pain management: adequate  Airway patency: patent  Anesthetic complications: No anesthetic complications    Cardiovascular status: acceptable  Respiratory status: acceptable  Hydration status: acceptable    Comments: /99   Pulse 91   Temp 36.6 °C (97.9 °F) (Temporal)   Resp 16   SpO2 98%

## 2019-05-20 ENCOUNTER — OFFICE VISIT (OUTPATIENT)
Dept: ORTHOPEDIC SURGERY | Facility: CLINIC | Age: 34
End: 2019-05-20

## 2019-05-20 VITALS — WEIGHT: 220 LBS | TEMPERATURE: 98.1 F | BODY MASS INDEX: 29.8 KG/M2 | HEIGHT: 72 IN

## 2019-05-20 DIAGNOSIS — S92.512D CLOSED DISPLACED FRACTURE OF PROXIMAL PHALANX OF LESSER TOE OF LEFT FOOT WITH ROUTINE HEALING, SUBSEQUENT ENCOUNTER: Primary | ICD-10-CM

## 2019-05-20 PROCEDURE — 29540 STRAPPING ANKLE &/FOOT: CPT | Performed by: ORTHOPAEDIC SURGERY

## 2019-05-20 PROCEDURE — 73630 X-RAY EXAM OF FOOT: CPT | Performed by: ORTHOPAEDIC SURGERY

## 2019-05-20 PROCEDURE — 99024 POSTOP FOLLOW-UP VISIT: CPT | Performed by: ORTHOPAEDIC SURGERY

## 2019-05-20 NOTE — PROGRESS NOTES
"Foot Follow Up      Patient: Bronson Phillips    YOB: 1985 34 y.o. male    Chief Complaints: Toe hurts been doing okay    History of Present Illness: Patient follows up ORIF of his left fifth proximal phalanx on 5/10/2019.  He has been doing partial weightbearing with a cane due to shoulder issues that he is being seen elsewhere for and he is using a postoperative shoe.  He has mild to moderate complaints of pain in the fifth toe but it is improving.  HPI    ROS: Foot pain no fevers chills chest pain or shortness of breath  History reviewed. No pertinent past medical history.  Physical Exam:   Vitals:    05/20/19 1408   Temp: 98.1 °F (36.7 °C)   TempSrc: Temporal   Weight: 99.8 kg (220 lb)   Height: 182.9 cm (72\")   PainSc:   8   PainLoc: Foot     Well developed with normal mood.  Left foot shows only mild swelling to the left fifth toe with no clinical deformity.  There is brisk capillary refill with some diminished sensation in the distal aspect of the toe.  Pin site is without sign of infection and no compression of the ball against the tip of the toe.  Left calf nontender without sign of DVT      Radiology: 3 views of the left foot ordered to evaluate fifth toe alignment reviewed with him and compared with previous x-rays.  The fifth proximal phalanx fracture appears to be well aligned and hardware is contained.  There is no significant change compared with intraoperative x-rays.      Assessment/Plan: Status post ORIF left fifth toe proximal phalanx    Sutures removed and Steri-Strips are applied.  Placed him back into 5th toe spica dressing.  He will continue with partial foot flat weightbearing only with a cane and postoperative shoe.    I will see him back in 2 weeks with x-rays of his left foot.  We will try to keep the pin in at least 4 to 6 weeks if we can and it is unlikely that he will be able to return to any type of meaningful work for at least 12 weeks after surgery.  "

## 2019-06-03 ENCOUNTER — OFFICE VISIT (OUTPATIENT)
Dept: ORTHOPEDIC SURGERY | Facility: CLINIC | Age: 34
End: 2019-06-03

## 2019-06-03 VITALS — HEIGHT: 72 IN | WEIGHT: 220 LBS | BODY MASS INDEX: 29.8 KG/M2 | TEMPERATURE: 98.1 F

## 2019-06-03 DIAGNOSIS — S92.512D CLOSED DISPLACED FRACTURE OF PROXIMAL PHALANX OF LESSER TOE OF LEFT FOOT WITH ROUTINE HEALING, SUBSEQUENT ENCOUNTER: Primary | ICD-10-CM

## 2019-06-03 PROCEDURE — 99024 POSTOP FOLLOW-UP VISIT: CPT | Performed by: ORTHOPAEDIC SURGERY

## 2019-06-03 PROCEDURE — 29540 STRAPPING ANKLE &/FOOT: CPT | Performed by: ORTHOPAEDIC SURGERY

## 2019-06-03 PROCEDURE — 73630 X-RAY EXAM OF FOOT: CPT | Performed by: ORTHOPAEDIC SURGERY

## 2019-06-03 NOTE — PROGRESS NOTES
"Foot Follow Up      Patient: Bronson Phillips    YOB: 1985 34 y.o. male    Chief Complaints: Foot doing okay    History of Present Illness: Follows up ORIF left fifth toe proximal phalanx on 5/10/2019.  He was last seen on 5/23/2019 for dressing change.  Instructions are given at that time to continue with his boot and use of a cane.  His pain has improved with mild intermittent aching pain in the left fifth toe    He has not been using his cane recently  HPI    ROS: Foot pain, no fevers chills chest pain or shortness of breath  History reviewed. No pertinent past medical history.  Physical Exam:   Vitals:    06/03/19 0802   Temp: 98.1 °F (36.7 °C)   Weight: 99.8 kg (220 lb)   Height: 182.9 cm (72\")     Well developed with normal mood.  Left fifth toe shows only mild swelling incision is healing well without sign of infection pin site is clean as well.  Left calf is nontender without sign of DVT status post ORIF left fifth toe proximal phalanx    He continues to do well      Radiology: 3 views of the left foot ordered to evaluate fracture alignment reviewed and compared with previous x-rays.  Fracture appears to remain well aligned      Assessment/Plan: Relieve his pain and is this was unstable fracture do not see any sign of infection at this time.    Steri-Strips were removed and he was placed back into a thumb spica dressing and continue with his boot and recommend use of his cane.    I will see him on 6/13/2019 after he returns from an out-of-town trip x-rays of his left foot.   "

## 2019-06-13 ENCOUNTER — OFFICE VISIT (OUTPATIENT)
Dept: ORTHOPEDIC SURGERY | Facility: CLINIC | Age: 34
End: 2019-06-13

## 2019-06-13 VITALS — HEIGHT: 72 IN | BODY MASS INDEX: 29.8 KG/M2 | TEMPERATURE: 97.6 F | WEIGHT: 220 LBS

## 2019-06-13 DIAGNOSIS — S92.512D CLOSED DISPLACED FRACTURE OF PROXIMAL PHALANX OF LESSER TOE OF LEFT FOOT WITH ROUTINE HEALING, SUBSEQUENT ENCOUNTER: ICD-10-CM

## 2019-06-13 DIAGNOSIS — Z98.890 S/P FOOT SURGERY, LEFT: Primary | ICD-10-CM

## 2019-06-13 PROCEDURE — 73630 X-RAY EXAM OF FOOT: CPT | Performed by: ORTHOPAEDIC SURGERY

## 2019-06-13 PROCEDURE — 99024 POSTOP FOLLOW-UP VISIT: CPT | Performed by: ORTHOPAEDIC SURGERY

## 2019-06-13 NOTE — PROGRESS NOTES
"Foot Follow Up      Patient: Bronson Phillips    YOB: 1985 34 y.o. male    Chief Complaints: Toe doing okay    History of Present Illness: Follows up left fifth toe proximal phalangeal ORIF 5/10/2019.  Last seen on 6/3/2019.  He is since been in a spica dressing and weightbearing in his boot.    His only some slight occasional achiness in the left fifth toe    ROS: Foot pain no fevers chills chest pain or shortness of breath  History reviewed. No pertinent past medical history.  Physical Exam:   Vitals:    06/13/19 0939   Temp: 97.6 °F (36.4 °C)   Weight: 99.8 kg (220 lb)   Height: 182.9 cm (72\")     Well developed with normal mood.  Left fifth toe shows only slight swelling no clinical deformity incision appears to be healing very nicely.  Pin site was intact with no sign of erythema or drainage.      Radiology: 3 views of the left foot ordered to evaluate fracture alignment reviewed and compared with previous x-rays.  Pin appears to remain intact without appreciable change in alignment to the fracture.  In the fifth toe      Assessment/Plan: Status post left fifth toe ORIF    As there is no sign of clinical infection at this time I would like to leave his pin and to begin at least 6 weeks postop given the comminution and small cross-sectional area of this fracture.    We will allow him to let this get wet in the shower but understands not to immerse it.  We will let him discontinue the wrap and continue with the boot weightbearing as tolerated.  He will use Tylenol for pain and I will see him back on 6/24/2019 with x-rays of his left fifth toe with plans for pin removal at that time    Offered to see him back earlier but he is going to be out of town  "

## 2019-06-24 ENCOUNTER — OFFICE VISIT (OUTPATIENT)
Dept: ORTHOPEDIC SURGERY | Facility: CLINIC | Age: 34
End: 2019-06-24

## 2019-06-24 VITALS — WEIGHT: 220 LBS | HEIGHT: 72 IN | BODY MASS INDEX: 29.8 KG/M2 | TEMPERATURE: 97.4 F

## 2019-06-24 DIAGNOSIS — Z98.890 S/P FOOT SURGERY, LEFT: Primary | ICD-10-CM

## 2019-06-24 DIAGNOSIS — S92.512D CLOSED DISPLACED FRACTURE OF PROXIMAL PHALANX OF LESSER TOE OF LEFT FOOT WITH ROUTINE HEALING, SUBSEQUENT ENCOUNTER: ICD-10-CM

## 2019-06-24 PROCEDURE — 73630 X-RAY EXAM OF FOOT: CPT | Performed by: ORTHOPAEDIC SURGERY

## 2019-06-24 PROCEDURE — 99024 POSTOP FOLLOW-UP VISIT: CPT | Performed by: ORTHOPAEDIC SURGERY

## 2019-06-24 NOTE — PROGRESS NOTES
"Foot Follow Up      Patient: Bronson Phillips    YOB: 1985 34 y.o. male    Chief Complaints: Toe feels okay    History of Present Illness: Patient had left fifth toe proximal phalangeal ORIF on 5/10/2019.    He was last seen on 6/13/2019.  He has been weightbearing in a boot without significant complaints of pain in the left fifth toe other than a slight occasional ache  HPI    ROS: Foot pain, no fevers chills chest pain shortness of breath  History reviewed. No pertinent past medical history.  Physical Exam:   Vitals:    06/24/19 0929   Temp: 97.4 °F (36.3 °C)   TempSrc: Temporal   Weight: 99.8 kg (220 lb)   Height: 182.9 cm (72\")     Well developed with normal mood.  Left fifth toe shows only slight swelling with neutral alignment.  No focal discomfort to palpation.  Pin site was clear without sign of infection      Radiology: 3 views left foot ordered to evaluate fifth toe proximal phalanx fracture reviewed and compared with x-rays from last visit.  There is no change in alignment of the fracture does appear to be some increased callus formation without significant change in alignment.      Assessment/Plan: Status post left fifth toe ORIF    Reviewed treatment options and his pin was removed as is been 6 weeks now.    I offered the placement of forefoot and ankle spica dressing to help protect this but he did not want to have this as he said he \"always comes off\".  He was fitted with a small silicone spacer between his fourth and fifth toes with instructions on jason taping his fourth and fifth toes.    The boot has been cumbersome for him so he will transition to a postoperative shoe and limit activity.  Follow-up in 2 weeks  "

## 2019-07-02 ENCOUNTER — TELEPHONE (OUTPATIENT)
Dept: ORTHOPEDIC SURGERY | Facility: CLINIC | Age: 34
End: 2019-07-02

## 2019-07-10 ENCOUNTER — OFFICE VISIT (OUTPATIENT)
Dept: ORTHOPEDIC SURGERY | Facility: CLINIC | Age: 34
End: 2019-07-10

## 2019-07-10 VITALS — BODY MASS INDEX: 29.8 KG/M2 | HEIGHT: 72 IN | WEIGHT: 220 LBS

## 2019-07-10 DIAGNOSIS — S92.512D CLOSED DISPLACED FRACTURE OF PROXIMAL PHALANX OF LESSER TOE OF LEFT FOOT WITH ROUTINE HEALING, SUBSEQUENT ENCOUNTER: ICD-10-CM

## 2019-07-10 DIAGNOSIS — Z09 FRACTURE FOLLOW-UP: Primary | ICD-10-CM

## 2019-07-10 PROCEDURE — 99024 POSTOP FOLLOW-UP VISIT: CPT | Performed by: ORTHOPAEDIC SURGERY

## 2019-07-10 PROCEDURE — 73660 X-RAY EXAM OF TOE(S): CPT | Performed by: ORTHOPAEDIC SURGERY

## 2019-07-10 NOTE — PROGRESS NOTES
"Foot Follow Up      Patient: Bronson Phillips    YOB: 1985 34 y.o. male    Chief Complaints: Foot doing okay    History of Present Illness: Patient had left fifth toe proximal phalanx ORIF on 5/10/2019.    He was last seen on 6/24/2019 at which time his pin was removed and he was instructed to continue with the silicone spacer and jason taping the fourth and fifth toes.    At that time he was instructed to transition to a postoperative shoe with the boot was too cumbersome for him but he is been using fairly flimsy soft sided low first.  He complains of some mild aching pain in the left fifth toe  HPI    ROS: Foot pain  History reviewed. No pertinent past medical history.  Physical Exam:   Vitals:    07/10/19 1026   Weight: 99.8 kg (220 lb)   Height: 182.9 cm (72\")     Well developed with normal mood.  On exam his incision remains well-healed there is only slight swelling to the fifth toe and no warmth or erythema.  No clinical deformity.  Only mild discomfort to palpation      Radiology: 3 views of the left fifth toe ordered to evaluate fracture reviewed and compared to previous x-rays.  Fracture line is still evident but there is no appreciable displacement that does appear to be callus formation.      Assessment/Plan: Status post left fifth toe ORIF.    Reviewed with him that the fracture line is still somewhat evident but it does appear to be callus around it and no clinical deformity so I would not recommend any further surgical treatment.    He will continue jason taping this to protect it and if he is not can use the postoperative shoe at least use wide deep accommodative close toed shoes and no running or jumping.    He does not feel he can get into work boots yet.    I will see him back in 6 weeks x-rays of his left foot  "

## 2019-08-05 ENCOUNTER — TELEPHONE (OUTPATIENT)
Dept: ORTHOPEDIC SURGERY | Facility: CLINIC | Age: 34
End: 2019-08-05

## 2019-08-12 NOTE — TELEPHONE ENCOUNTER
He should be off work at least until I see him back and may be longer depending on our exam.  Expectant time off for full recovery could be at least up to 6 months depending on how he is doing.  I do not recommend any other further treatments at this time other than as instructed at her last visit.

## 2019-08-22 ENCOUNTER — HOSPITAL ENCOUNTER (EMERGENCY)
Facility: HOSPITAL | Age: 34
Discharge: HOME | End: 2019-08-23
Attending: EMERGENCY MEDICINE | Admitting: EMERGENCY MEDICINE

## 2019-08-22 ENCOUNTER — TELEPHONE (OUTPATIENT)
Dept: ORTHOPEDIC SURGERY | Facility: CLINIC | Age: 34
End: 2019-08-22

## 2019-08-22 DIAGNOSIS — J45.21 MILD INTERMITTENT ASTHMA WITH EXACERBATION: Primary | ICD-10-CM

## 2019-08-22 PROCEDURE — 99283 EMERGENCY DEPT VISIT LOW MDM: CPT

## 2019-08-22 RX ORDER — PREDNISONE 10 MG/1
20 TABLET ORAL DAILY
COMMUNITY
Start: 2019-08-06

## 2019-08-22 RX ORDER — PREDNISONE 20 MG/1
60 TABLET ORAL ONCE
Status: COMPLETED | OUTPATIENT
Start: 2019-08-22 | End: 2019-08-23

## 2019-08-22 RX ORDER — IPRATROPIUM BROMIDE AND ALBUTEROL SULFATE 2.5; .5 MG/3ML; MG/3ML
3 SOLUTION RESPIRATORY (INHALATION) ONCE
Status: COMPLETED | OUTPATIENT
Start: 2019-08-22 | End: 2019-08-23

## 2019-08-22 NOTE — TELEPHONE ENCOUNTER
Called to inform patient ok to come at 3pm. He then explains that he will get to the [facility] [place] at 3pm. Says he can be here at 4pm. Please advise.

## 2019-08-22 NOTE — TELEPHONE ENCOUNTER
Patient has last post op appt today at 1:00 with MWM. He was in [place] and had a delay with transportaion. He is on route now but says he probably won't be in [place] until 3pm. He wants to know if he can come in then.

## 2019-08-22 NOTE — TELEPHONE ENCOUNTER
Patient called stating he had a 5 hr delay leaving [place], [place] last night 8/21/19 and a 2 hr delay in [place] today 8/22/19 - patient stated he is near [place], [place] but that he wouldn't arrive till around 4:00 - 4:30 PM today. Spoke to MWM who stated he could see patient tomorrow Fri 8/23 around 9:30 - 10:00 AM (but that schedule may change since MWM is to be in OR doing surgery tomorrow 8/23).     Notified patent to arrive around 9:30, no later than 9:45 AM tomorrow Fri 8/23 to be seen by / per MWM.     Gave add-on note to Karissa to reschedule today's appt but to change from incorrect PO to correct FU 6 weeks LEFT foot / sx date 5/10/19.     Patient stated he would work on getting his 6:45 PM [place] departure ticket from today 8/22 changed till tomorrow afternoon.

## 2019-08-22 NOTE — TELEPHONE ENCOUNTER
Patient says he will come at 4pm today unless something else happens. When he gets back in [place] he will call if any changes. He says to thank VIKTORIYA.

## 2019-08-23 ENCOUNTER — OFFICE VISIT (OUTPATIENT)
Dept: ORTHOPEDIC SURGERY | Facility: CLINIC | Age: 34
End: 2019-08-23

## 2019-08-23 ENCOUNTER — LAB (OUTPATIENT)
Dept: LAB | Facility: HOSPITAL | Age: 34
End: 2019-08-23

## 2019-08-23 VITALS — TEMPERATURE: 98.4 F | WEIGHT: 219 LBS | HEIGHT: 72 IN | BODY MASS INDEX: 29.66 KG/M2

## 2019-08-23 VITALS
SYSTOLIC BLOOD PRESSURE: 130 MMHG | DIASTOLIC BLOOD PRESSURE: 95 MMHG | RESPIRATION RATE: 18 BRPM | HEART RATE: 60 BPM | WEIGHT: 219 LBS | BODY MASS INDEX: 29.66 KG/M2 | TEMPERATURE: 97.2 F | HEIGHT: 72 IN | OXYGEN SATURATION: 98 %

## 2019-08-23 DIAGNOSIS — S92.512G CLOSED DISPLACED FRACTURE OF PROXIMAL PHALANX OF LESSER TOE OF LEFT FOOT WITH DELAYED HEALING, SUBSEQUENT ENCOUNTER: ICD-10-CM

## 2019-08-23 DIAGNOSIS — E55.9 VITAMIN D DEFICIENCY: ICD-10-CM

## 2019-08-23 DIAGNOSIS — Z09 FRACTURE FOLLOW-UP: ICD-10-CM

## 2019-08-23 DIAGNOSIS — S92.512G CLOSED DISPLACED FRACTURE OF PROXIMAL PHALANX OF LESSER TOE OF LEFT FOOT WITH DELAYED HEALING, SUBSEQUENT ENCOUNTER: Primary | ICD-10-CM

## 2019-08-23 DIAGNOSIS — M77.42 METATARSALGIA OF LEFT FOOT: ICD-10-CM

## 2019-08-23 LAB — 25(OH)D3 SERPL-MCNC: 25.9 NG/ML (ref 30–100)

## 2019-08-23 PROCEDURE — 94799 UNLISTED PULMONARY SVC/PX: CPT

## 2019-08-23 PROCEDURE — 73630 X-RAY EXAM OF FOOT: CPT | Performed by: ORTHOPAEDIC SURGERY

## 2019-08-23 PROCEDURE — 63710000001 PREDNISONE PER 1 MG: Performed by: PHYSICIAN ASSISTANT

## 2019-08-23 PROCEDURE — 82306 VITAMIN D 25 HYDROXY: CPT

## 2019-08-23 PROCEDURE — 99214 OFFICE O/P EST MOD 30 MIN: CPT | Performed by: ORTHOPAEDIC SURGERY

## 2019-08-23 PROCEDURE — 36415 COLL VENOUS BLD VENIPUNCTURE: CPT

## 2019-08-23 PROCEDURE — 94640 AIRWAY INHALATION TREATMENT: CPT

## 2019-08-23 RX ORDER — METHYLPREDNISOLONE 4 MG/1
TABLET ORAL
Qty: 1 EACH | Refills: 0 | OUTPATIENT
Start: 2019-08-23 | End: 2019-08-23 | Stop reason: SDUPTHER

## 2019-08-23 RX ORDER — ALBUTEROL SULFATE 90 UG/1
2 AEROSOL, METERED RESPIRATORY (INHALATION) EVERY 4 HOURS PRN
Qty: 1 INHALER | Refills: 0 | OUTPATIENT
Start: 2019-08-23 | End: 2019-08-23 | Stop reason: SDUPTHER

## 2019-08-23 RX ORDER — METHYLPREDNISOLONE 4 MG/1
TABLET ORAL
Qty: 1 EACH | Refills: 0 | Status: SHIPPED | OUTPATIENT
Start: 2019-08-23

## 2019-08-23 RX ORDER — ALBUTEROL SULFATE 90 UG/1
2 AEROSOL, METERED RESPIRATORY (INHALATION) EVERY 4 HOURS PRN
Qty: 1 INHALER | Refills: 0 | Status: SHIPPED | OUTPATIENT
Start: 2019-08-23

## 2019-08-23 RX ADMIN — IPRATROPIUM BROMIDE AND ALBUTEROL SULFATE 3 ML: 2.5; .5 SOLUTION RESPIRATORY (INHALATION) at 00:21

## 2019-08-23 RX ADMIN — PREDNISONE 60 MG: 20 TABLET ORAL at 00:09

## 2019-08-23 NOTE — ED PROVIDER NOTES
EMERGENCY DEPARTMENT ENCOUNTER    CHIEF COMPLAINT  Chief Complaint: Shortness of Air   History given by: Patient   History limited by: none   Room Number: 30/30  PMD: Provider, No Known      HPI:  Pt is a 34 y.o. male who presents to the ED via EMS complaining of shortness of air for the past several weeks, exacerbated with being outdoors in the heat. Pt denies fever or cough. Pt states he has hx of asthma, usually managed with inhalers and Prednisone, but states he has been unable to fill the majority of his prescriptions due to financial issues. Per pt, he was wheezing upon arrival of EMS and had improvement of breathing en route with Duo-neb treatment.     Duration:  Several weeks   Onset: gradual   Timing: constant   Location: chest   Radiation: none   Quality: SOA   Intensity/Severity: mild   Progression: improved   Associated Symptoms: wheezing   Aggravating Factors: being outdoors, lack of compliance with medication   Alleviating Factors: none   Previous Episodes: none   Treatment before arrival: Pt was given Duo-neb en route via EMS.     PAST MEDICAL HISTORY  Active Ambulatory Problems     Diagnosis Date Noted   • Closed displaced fracture of proximal phalanx of lesser toe of left foot 05/09/2019     Resolved Ambulatory Problems     Diagnosis Date Noted   • No Resolved Ambulatory Problems     Past Medical History:   Diagnosis Date   • Asthma        PAST SURGICAL HISTORY  Past Surgical History:   Procedure Laterality Date   • SHOULDER ARTHROSCOPY Left    • TOE OPEN REDUCTION INTERNAL FIXATION Left 5/10/2019    Procedure: left fifthTOE OPEN REDUCTION INTERNAL FIXATION;  Surgeon: Angel Castillo MD;  [facility];  Service: Orthopedics       FAMILY HISTORY  History reviewed. No pertinent family history.    SOCIAL HISTORY  Social History     Socioeconomic History   • Marital status: Single     Spouse name: Not on file   • Number of children: Not on file   • Years of education: Not on file    • Highest education level: Not on file   Tobacco Use   • Smoking status: Current Some Day Smoker     Packs/day: 0.50     Types: Cigarettes   • Smokeless tobacco: Never Used   Substance and Sexual Activity   • Alcohol use: No     Frequency: Never   • Drug use: No   • Sexual activity: Defer       ALLERGIES  Patient has no known allergies.    REVIEW OF SYSTEMS  Review of Systems   Constitutional: Negative for activity change, appetite change and fever.   HENT: Negative for congestion and sore throat.    Eyes: Negative.    Respiratory: Positive for shortness of breath and wheezing. Negative for cough.    Cardiovascular: Negative for chest pain and leg swelling.   Gastrointestinal: Negative for abdominal pain, diarrhea and vomiting.   Endocrine: Negative.    Genitourinary: Negative for decreased urine volume and dysuria.   Musculoskeletal: Negative for neck pain.   Skin: Negative for rash and wound.   Allergic/Immunologic: Negative.    Neurological: Negative for weakness, numbness and headaches.   Hematological: Negative.    Psychiatric/Behavioral: Negative.    All other systems reviewed and are negative.      PHYSICAL EXAM  ED Triage Vitals   Temp Heart Rate Resp BP SpO2   08/22/19 2315 08/22/19 2308 08/22/19 2308 08/22/19 2308 08/22/19 2308   97.2 °F (36.2 °C) 70 18 136/94 98 %      Temp src Heart Rate Source Patient Position BP Location FiO2 (%)   08/22/19 2315 -- -- -- --   Tympanic           Physical Exam   Constitutional: He is oriented to person, place, and time. No distress.   HENT:   Head: Normocephalic and atraumatic.   Eyes: EOM are normal. Pupils are equal, round, and reactive to light.   Neck: Normal range of motion. Neck supple.   Cardiovascular: Normal rate, regular rhythm and normal heart sounds.   Pulmonary/Chest: Effort normal. No respiratory distress. He has wheezes (occasional and scattered).   Abdominal: Soft. There is no tenderness. There is no rebound and no guarding.   Musculoskeletal: Normal  range of motion. He exhibits no edema.   Neurological: He is alert and oriented to person, place, and time. He has normal sensation and normal strength.   Skin: Skin is warm and dry.   Psychiatric: Mood and affect normal.   Nursing note and vitals reviewed.    Procedures      PROGRESS AND CONSULTS     2350: Upon pt exam, pt is resting and in no acute distress. Discussed plan for further breathing treatments in ED prior to discharge. Agreed to rewrite pt prescriptions for his medications. Pt directed to follow up with PCP. Pt understands and agrees with the plan, all questions answered.      2352: Duo-neb and Deltasone ordered for breathing treatment.       MEDICAL DECISION MAKING  Results were reviewed/discussed with the patient and they were also made aware of online access. Pt also made aware that some labs, such as cultures, will not be resulted during ER visit and follow up with PMD is necessary.     MDM       DIAGNOSIS  Final diagnoses:   Mild intermittent asthma with exacerbation       DISPOSITION  DISCHARGE    Patient discharged in stable condition.    Reviewed implications of results, diagnosis, meds, responsibility to follow up, warning signs and symptoms of possible worsening, potential complications and reasons to return to ER.    Patient/Family voiced understanding of above instructions.    Discussed plan for discharge, as there is no emergent indication for admission. Patient referred to primary care provider for BP management due to today's BP. Pt/family is agreeable and understands need for follow up and repeat testing.  Pt is aware that discharge does not mean that nothing is wrong but it indicates no emergency is present that requires admission and they must continue care with follow-up as given below or physician of their choice.     FOLLOW-UP  PATIENT LIAISON [facility] [id number] [phone]  Schedule an appointment as soon as possible for a visit in 1 week           Medication  List      New Prescriptions    methylPREDNISolone 4 MG tablet  Commonly known as:  MEDROL (JUN)  Take as directed on package instructions.        Changed    * albuterol sulfate  (90 Base) MCG/ACT inhaler  Commonly known as:  PROVENTIL HFA;VENTOLIN HFA;PROAIR HFA  What changed:  Another medication with the same name was added. Make sure   you understand how and when to take each.     * albuterol sulfate  (90 Base) MCG/ACT inhaler  Commonly known as:  PROVENTIL HFA;VENTOLIN HFA;PROAIR HFA  Inhale 2 puffs Every 4 (Four) Hours As Needed for Wheezing.  What changed:  You were already taking a medication with the same name,   and this prescription was added. Make sure you understand how and when to   take each.         * This list has 2 medication(s) that are the same as other medications   prescribed for you. Read the directions carefully, and ask your doctor or   other care provider to review them with you.            Stop    oxyCODONE-acetaminophen 5-325 MG per tablet  Commonly known as:  PERCOCET              Latest Documented Vital Signs:  As of 1:36 AM  BP- 130/95 HR- 60 Temp- 97.2 °F (36.2 °C) (Tympanic) O2 sat- 98%    --  Documentation assistance provided by bulmaro Lopez for Yo Jimenez PA-C.  Information recorded by the scribe was done at my direction and has been verified and validated by me.            Delia Lopez  08/23/19 0105       Yo Jimenez PA  08/23/19 0136

## 2019-08-23 NOTE — PROGRESS NOTES
"Foot Follow Up      Patient: Bronson Phillips    YOB: 1985 34 y.o. male    Chief Complaints: Foot sore    History of Present Illness: Patient had left fifth proximal phalanx ORIF on 5/10/2019.  He was last seen on 7/10/2019 and had had his pin removed on 6/24/2019 6 weeks after his surgery and had been instructed to continue with silicone spacer and jason taping.      At our 6/24/2019 visit he was instructed to transition to a postoperative shoe as his boot was too cumbersome and at her last visit he had been using a fairly flimsy soft sided loafer and had mild complaints of aching pain in the left fifth toe.  Reviewed with him at her last visit that the fracture line was still somewhat evident and he was instructed to continue jason taping and to protect this    And if unable to use the postoperative shoe to use the use a wide deep accommodative closed toed shoe.    He is mainly been using slides because he said the closed toed shoe was too uncomfortable.  Complains of some mild aching pain along the fifth toe and also pain in the lateral and plantar aspect of the fifth metatarsal head distally.  HPI    ROS: Foot pain no fevers chills chest pain or shortness of breath  Past Medical History:   Diagnosis Date   • Asthma      Physical Exam:   Vitals:    08/23/19 0949   Temp: 98.4 °F (36.9 °C)   Weight: 99.3 kg (219 lb)   Height: 182.9 cm (72\")     Well developed with normal mood.  On exam there is neutral alignment of the fifth toe with mild swelling and no warmth erythema.  There is mild discomfort along the fifth proximal phalanx but no gross instability noted to palpation or manipulation.  There was some mild discomfort and slight prominence on the lateral aspect of the fifth metatarsal head and somewhat plantar laterally but no callus formation or bursa.      Radiology: 3 views of the left foot and lateral view of the left fifth toe ordered to evaluate pain and alignment reviewed and compared with " previous x-rays.  I do not see any obvious fracture along the fifth metatarsal but there appears to be nonunion of the fifth proximal phalanx with some fracture line evident and persistent callus over the lateral and dorsal aspects of the fracture.  There is apparent fracture gap remaining of at least 2 mm.  There is some mild bony prominence of the lateral aspect of the left fifth metatarsal      Assessment/Plan: 1.  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    We discussed treatment options going forward and this is a very difficult problem to address surgically given the location of this.  What we are going to do is to check a vitamin D level at this may be low and may need to be augmented to assist in fracture healing and we will go ahead and order a bone stimulator as he has apparent nonunion.    I want to check a CT scan to assess alignment and any degree of healing and tailor treatment accordingly from there.    He is unable to work at this time and understands to call to schedule follow-up appointment after CT scan has been scheduled in order to review results in the office.  He told me he has appreciated my care

## 2019-08-23 NOTE — ED NOTES
[place] is from [place] [facility]t (pt used to reside in [place]). Pt states that his bus got delayed a whole day and he had to re-schedule his appt with Dr Bolivar for tomorrow morning. Pt does not currently have a place to stay as he was planning to go back to [place] the same day as his appt before it got delayed. [facility] RN came to talk with patient and is providing a cab voucher for after his appt to get back to the [facility]. Per charge RN, pt is allowed to stay in waiting room until his appt in the morning.     Patricia Gastelum, RN  08/23/19 0133       Patricia Gastelum, RN  08/23/19 0134

## 2019-08-23 NOTE — ED PROVIDER NOTES
MD ATTESTATION NOTE    The SARAH and I have discussed this patient's history, physical exam, and treatment plan.  I have reviewed the documentation and personally had a face to face interaction with the patient. I affirm the documentation and agree with the treatment and plan.  The attached note describes my personal findings.    Patient presents with some shortness of breath and wheezing for several days now.  He gets worse when he is outside in the heat.  He says he has a history of asthma which is managed symptomatically with steroids and inhalers as needed.    On exam he initially had some wheezes, but it is improved with the treatment here.  He is now resting comfortably in no distress.     Anup Allen MD  08/23/19 0136

## 2019-08-23 NOTE — ED TRIAGE NOTES
Pt to ED via [facility] for c/o SOA x 2 weeks.  Pt reports hx of asthma, and that he was running low on his medication.  EMS gave albuterol x1 en route.

## 2019-08-27 ENCOUNTER — TELEPHONE (OUTPATIENT)
Dept: ORTHOPEDIC SURGERY | Facility: CLINIC | Age: 34
End: 2019-08-27

## 2019-08-28 NOTE — TELEPHONE ENCOUNTER
Saw patient last week and we sent him for vitamin D level which he had done before he got back on the bus to return to [place] where he is now living.  I spoke with him before he left town and he wanted the vitamin D supplementation sent to pharmacy in [place] but did not have the number.  He told me at that time he was going to call with the number for pharmacy down there     I had not heard back from him and got in touch with him today.  He said he had called and left a number but I never got the message.    Reviewed with him that his vitamin D level was low at 25.9 with low normal being 30.    I went ahead and called a prescription in for him for vitamin D 30 50,000 units to take once weekly for 4 weeks with no refills and called and spoke with the pharmacist personally about this in [place] at [facility], [place] [address].  Phone #[phone].    On further discussion with the patient I have recommended that he potentially find and see a foot and ankle orthopedic specialist to take care of him going forward in [place] as he lives down there now and logistically is very difficult for him to travel back and forth for tests treatments etc.  He was in agreement with this and through the [facility] [employer] website I found and gave him the name address and number of Dr. Umair Easley at [address], [facility], [address].  Phone #[phone].    We will hold on the CT scan now that had been ordered    Patient said he would try to call them to make an appointment understands that I cannot make the referral directly or he said he may try to find one on his own and that he appreciated all of my care.    He said he was contacted by the bone stimulator rep last week but did not had any further follow-up and I recommended that he see someone down there before pursuing that further and could have them address his vitamin D going forward and address any further surgical or nonsurgical treatment  options for him.    We will mail a copy of all of his medical records including operative note as well as copies of his x-rays and laboratory data to him at [address], [place] [address].  He told me that he had appreciated my care and that his claim going forward have been denied by work comp and that he was going to be using his [employer] insurance

## 2019-08-29 ENCOUNTER — TELEPHONE (OUTPATIENT)
Dept: ORTHOPEDIC SURGERY | Facility: CLINIC | Age: 34
End: 2019-08-29

## 2021-06-05 NOTE — TELEPHONE ENCOUNTER
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) admitted 5/26 for advanced     (Acute on) chronic systolic CHF, EF 13%  LVAD placement 6/4/2021  Postprocedural respiratory failure  Hypertension  Hyponatremia -> slightly worse today 6/3  Mild acute kidney injury, improved today 6/3 w/ normalized K  Hx tobacco abuse  Hx nocturnal hypoxia, he was told prior sleep study no need for CPAP but recommended home O2  Gout w/ flare -> s/p prednisone x1 and triamcinolone injection 5/31    Recommendations:  On full vent support minimal settings  Agitated earlier with weaning sedation, switching to Precedex  Start pressure support trials after switch to Dex with goal to extubate  Reduce Vt to 600 (6-7CC/Kg IBW), increase RR to 16  ABG in 30 min  Continue bronchial hygiene, I-S when extubated  Cardiac and volume status optimization per CV surgery  Chest x-ray with clear lungs, likely left atelectasis   On dual inotropes,Epi.  Adjustment per CV surgery  VAD HM3, 5.1LPM, 6000RPM  Alexa @ 40, weaning per CVS  Creat stable, monitor renal f-n  LA 1.1  WBC nl, afebrile  Hb-9.1  Optimize nutrition  DVT prophylaxis on subcutaneous heparin    CCT 35 min  D/W RN, RT    Donald Pederson MD  Pulmonary and Critical Care      Subjective  Patient seen and examined, labs, vitals and imaging reviewed personally  LVAD placement yesterday, postop day 1  Remains intubated ACVC 12/700/8/40  ABG 7.4 2/38/122  SVO 2-64    Drips:  Propofol  Milrinone  Dobutamine  Epi  Insulin  Lasix    Objective  Vital signs in last 24 hours:  Temp:  [96.8 °F (36 °C)-101.7 °F (38.7 °C)] 101.7 °F (38.7 °C)  Heart Rate:  [116-133] 124  Resp:  [12-40] 31  BP: (103)/(81) 103/81  Arterial Line BP: ()/(50-95) 81/73  FiO2 (%):  [24 %-100 %] 43 %  Weight change:     Intake/Output this shift:  I/O this shift:  In: 831.2 [I.V.:556.2; Blood:275]  Out: 970 [Urine:900; Chest Tube:70]     Physical Exam:    General: Intubated, sedated  HENT: ET tube  CV: regular, mildly tachycardic  Pulm: Coarse breath sounds, mechanical.  Chest tubes in place  Abd: soft, nondistended  Skin: warm, dry, well perfused  Neuro: Intubated, sedated    Medications:  Scheduled  • albumin human       • albumin human  12.5 g Intravenous Once   • albumin human       • chlorhexidine gluconate  15 mL Swish & Spit 2 times per day   • Potassium Standard Replacement Protocol   Does not apply See Admin Instructions   • Magnesium Standard Replacement Protocol   Does not apply See Admin Instructions   • vancomycin (VANCOCIN) IVPB  1,000 mg Intravenous 2 times per day    And   • cefepime (MAXIPIME) IVPB  1,000 mg Intravenous 3 times per day   • heparin (porcine)  5,000 Units Subcutaneous 3 times per day   • metoPROLOL tartrate  25 mg Oral 2 times per day     Infusions  • furosemide (LASIX INJECT) 250 mg/125 mL in sodium chloride 0.9 % infusion 20 mg/hr (06/05/21 0959)   • sodium chloride 0.9% infusion     • dexMEDETomidine (PRECEDEX) 400 mcg/100 mL in sodium chloride 0.9 % infusion     • vasopressin (PITRESSIN) 20 unit/100 mL in sodium chloride 0.9 % infusion     • AMIODarone (CORDARONE) 450 mg/250 mL dextrose 5% infusion     • niCARdipine (CARDENE) 40 mg/200 mL in NaCl infusion Stopped (06/05/21 0750)   • lidocaine (XYLOCAINE) 2,000 mg/500 mL in dextrose 5 % infusion     • propofol (DIPRIVAN) infusion 50 mcg/kg/min (06/05/21 0959)   • dexMEDETomidine (PRECEDEX) 400 mcg/100 mL in sodium chloride 0.9 % infusion     • sodium chloride 0.9% infusion     • sodium chloride 0.9% infusion 10 mL/hr at 06/05/21 0959   • sodium chloride 0.9% infusion 20 mL/hr at 06/05/21 0959   • dextrose 5 % infusion     • insulin regular (human) (HumuLIN R) 100 units in sodium chloride 0.9% 100 mL infusion 3.8 Units/hr (06/05/21 0959)   • EPINEPHrine (ADRENALIN) 4 mg/250 mL in dextrose 5 % infusion 1 mcg/min (06/05/21 0959)   • DOBUTamine (DOBUTREX) 500 mg/250 mL in dextrose 5 %  infusion 5 mcg/kg/min (21)   • sodium chloride 0.9% infusion 10 mL/hr at 21   • dextrose 5 % / sodium chloride 0.45% infusion Stopped (21)   • milrinone (PRIMACOR) 20 mg/100 mL in dextrose 5 % infusion premix 0.5 mcg/kg/min (21)     PRN  sodium chloride, chlorhexidine gluconate **AND** chlorhexidine gluconate, acetaminophen, ondansetron **OR** ondansetron, AMIODarone (CARDARONE) bolus IVPB, [] AMIODarone (CORDARONE) infusion **FOLLOWED BY** AMIODarone (CORDARONE) infusion, niCARdipine (CARDENE) infusion, lidocaine (XYLOCAINE) infusion, MIDAZolam, [START ON 2021] sodium biphosphate, dexmedetomidine (PRECEDEX) infusion, sodium chloride, dextrose, dextrose, glucagon, dextrose, dextrose, calcium gluconate in sodium chloride 0.9% IVPB (customizable for apheresis), furosemide, lidocaine (cardiac), sodium bicarbonate, oxyCODONE-acetaminophen **OR** HYDROcodone-acetaminophen, HYDROcodone-acetaminophen **OR** oxyCODONE-acetaminophen     Results:  Labs:   Recent Results (from the past 24 hour(s))   BLOOD GAS, ARTERIAL SURGICAL -POINT OF CARE    Collection Time: 21 12:15 PM   Result Value Ref Range    PH, ARTERIAL - POINT OF CARE 7.37 7.35 - 7.45 Units    PCO2, ARTERIAL - POINT OF CARE 42 35 - 48 mm Hg    PO2, ARTERIAL - POINT OF CARE 260 (H) 83 - 108 mm Hg    HCO3, ARTERIAL - POINT OF CARE 24 22 - 28 mmol/L    BASE EXCESS / DEFICIT, ARTERIAL - POINT OF CARE -1 -2 - 3 mmol/L    O2 SATURATION, ARTERIAL - POINT OF CARE 100 (H) 95 - 99 %    SITE - POINT OF CARE SURGY     HEMOGLOBIN - POINT OF CARE 11.9 (L) 13.0 - 17.0 g/dL    SODIUM - POINT OF CARE 133 (L) 135 - 145 mmol/L    POTASSIUM - POINT OF CARE 3.8 3.4 - 5.1 mmol/L    CALCIUM, IONIZED - POINT OF CARE 1.09 (L) 1.15 - 1.29 mmol/L    LACTIC ACID, ARTERIAL - POINT OF CARE 1.8 (H) <1.6 mmol/L    GLUCOSE - POINT OF CARE 185 (H) 70 - 99 mg/dL    OXYHEMOGLOBIN, ARTERIAL - POINT OF CARE 97.0 94.0 - 98.0 %    METHEMOGLOBIN  - POINT OF CARE 1.5 <1.6 %    CARBOXYHEMOGLOBIN - POINT OF CARE 1.5 (H) <1.5 %    O2 CONTENT, ARTERIAL - POINT OF CARE 17.0 15.0 - 23.0 %   Magnesium    Collection Time: 06/04/21  2:02 PM   Result Value Ref Range    Magnesium 2.1 1.7 - 2.4 mg/dL   Comprehensive Metabolic Panel    Collection Time: 06/04/21  2:02 PM   Result Value Ref Range    Fasting Status      Sodium 138 135 - 145 mmol/L    Potassium 4.0 3.4 - 5.1 mmol/L    Chloride 106 98 - 107 mmol/L    Carbon Dioxide 25 21 - 32 mmol/L    Anion Gap 11 10 - 20 mmol/L    Glucose 169 (H) 65 - 99 mg/dL    BUN 30 (H) 6 - 20 mg/dL    Creatinine 1.38 (H) 0.67 - 1.17 mg/dL    Glomerular Filtration Rate 69 (L) >90 mL/min/1.73m2    BUN/ Creatinine Ratio 22 7 - 25    Calcium 7.8 (L) 8.4 - 10.2 mg/dL    Bilirubin, Total 1.8 (H) 0.2 - 1.0 mg/dL    GOT/ (H) <=37 Units/L    GPT/ALT 60 <64 Units/L    Alkaline Phosphatase 47 45 - 117 Units/L    Albumin 2.9 (L) 3.6 - 5.1 g/dL    Protein, Total 5.4 (L) 6.4 - 8.2 g/dL    Globulin 2.5 2.0 - 4.0 g/dL    A/G Ratio 1.2 1.0 - 2.4   CBC with Automated Differential (performable only)    Collection Time: 06/04/21  2:02 PM   Result Value Ref Range    WBC 15.7 (H) 4.2 - 11.0 K/mcL    RBC 3.82 (L) 4.50 - 5.90 mil/mcL    HGB 11.8 (L) 13.0 - 17.0 g/dL    HCT 34.8 (L) 39.0 - 51.0 %    MCV 91.1 78.0 - 100.0 fl    MCH 30.9 26.0 - 34.0 pg    MCHC 33.9 32.0 - 36.5 g/dL    RDW-CV 12.8 11.0 - 15.0 %    RDW-SD 41.9 39.0 - 50.0 fL     140 - 450 K/mcL    NRBC 0 <=0 /100 WBC    Neutrophil, Percent 80 %    Lymphocytes, Percent 7 %    Mono, Percent 12 %    Eosinophils, Percent 0 %    Basophils, Percent 0 %    Immature Granulocytes 1 %    Absolute Neutrophils 12.5 (H) 1.8 - 7.7 K/mcL    Absolute Lymphocytes 1.1 1.0 - 4.8 K/mcL    Absolute Monocytes 1.8 (H) 0.3 - 0.9 K/mcL    Absolute Eosinophils  0.0 0.0 - 0.5 K/mcL    Absolute Basophils 0.0 0.0 - 0.3 K/mcL    Absolute Immmature Granulocytes 0.2 0.0 - 0.2 K/mcL   Partial Thromboplastin Time     Collection Time: 06/04/21  2:02 PM   Result Value Ref Range    PTT 27 22 - 30 sec   Prothrombin Time    Collection Time: 06/04/21  2:02 PM   Result Value Ref Range    Prothrombin Time 11.9 (H) 9.7 - 11.8 sec    INR 1.1     Fibrinogen Activity    Collection Time: 06/04/21  2:02 PM   Result Value Ref Range    Fibrinogen 296 190 - 425 mg/dL   GLUCOSE, BEDSIDE - POINT OF CARE    Collection Time: 06/04/21  2:07 PM   Result Value Ref Range    GLUCOSE, BEDSIDE - POINT OF CARE 171 (H) 70 - 99 mg/dL   GLUCOSE, BEDSIDE - POINT OF CARE    Collection Time: 06/04/21  3:00 PM   Result Value Ref Range    GLUCOSE, BEDSIDE - POINT OF CARE 175 (H) 70 - 99 mg/dL   BLOOD GAS, ARTERIAL WITH COOXIMETRY - RESPIRATORY    Collection Time: 06/04/21  3:04 PM   Result Value Ref Range    BASE EXCESS / DEFICIT, ARTERIAL - RESPIRATORY -3 (L) -2 - 3 mmol/L    HCO3, ARTERIAL - RESPIRATORY 23 22 - 28 mmol/L    O2 CONTENT, ARTERIAL - RESPIRATORY 17 15 - 23 %    PCO2, ARTERIAL - RESPIRATORY 42 35 - 48 mm Hg    PH, ARTERIAL - RESPIRATORY 7.35 7.35 - 7.45 Units    PO2, ARTERIAL - RESPIRATORY 170 (H) 83 - 108 mm Hg    O2 SATURATION, ARTERIAL - RESPIRATORY 100 (H) 95 - 99 %    CONDITION - RESPIRATORY ;SIMV RR 12  100% PEEP 8 PS 10 RICHIE 40 PPM     CARBOXYHEMOGLOBIN - RESPIRATORY 1.7 1.5 - 15.0 %    HEMOGLOBIN - RESPIRATORY 12.4 (L) 13.0 - 17.0 g/dL    METHEMOGLOBIN - RESPIRATORY 1.5 <=1.6 %    OXYHEMOGLOBIN, ARTERIAL - RESPIRATORY 96.8 94.0 - 98.0 %    P/F RATIO - RESPIRATORY 174 (L) 300 - 500    SITE - RESPIRATORY Arterial Line     TEMPERATURE - RESPIRATORY 36.3 degrees   POTASSIUM - RESPIRATORY    Collection Time: 06/04/21  3:04 PM   Result Value Ref Range    POTASSIUM - RESPIRATORY 3.9 3.4 - 5.1 mmol/L   SODIUM - RESPIRATORY    Collection Time: 06/04/21  3:04 PM   Result Value Ref Range    SODIUM - RESPIRATORY 132 (L) 135 - 145 mmol/L   CALCIUM, IONIZED - RESPIRATORY    Collection Time: 06/04/21  3:04 PM   Result Value Ref Range    CALCIUM, IONIZED  - RESPIRATORY 1.14 (L) 1.15 - 1.29 mmol/L   LACTIC ACID, ARTERIAL - RESPIRATORY    Collection Time: 06/04/21  3:04 PM   Result Value Ref Range    LACTIC ACID, ARTERIAL - RESPIRATORY 1.8 (H) <1.6 mmol/L   GLUCOSE, BEDSIDE - POINT OF CARE    Collection Time: 06/04/21  4:01 PM   Result Value Ref Range    GLUCOSE, BEDSIDE - POINT OF CARE 163 (H) 70 - 99 mg/dL   Potassium    Collection Time: 06/04/21  4:42 PM   Result Value Ref Range    Potassium 3.9 3.4 - 5.1 mmol/L   Magnesium    Collection Time: 06/04/21  4:42 PM   Result Value Ref Range    Magnesium 2.0 1.7 - 2.4 mg/dL   GLUCOSE, BEDSIDE - POINT OF CARE    Collection Time: 06/04/21  5:03 PM   Result Value Ref Range    GLUCOSE, BEDSIDE - POINT OF CARE 168 (H) 70 - 99 mg/dL   GLUCOSE, BEDSIDE - POINT OF CARE    Collection Time: 06/04/21  6:02 PM   Result Value Ref Range    GLUCOSE, BEDSIDE - POINT OF CARE 160 (H) 70 - 99 mg/dL   BLOOD GAS, ARTERIAL WITH COOXIMETRY - RESPIRATORY    Collection Time: 06/04/21  6:05 PM   Result Value Ref Range    BASE EXCESS / DEFICIT, ARTERIAL - RESPIRATORY -3 (L) -2 - 3 mmol/L    HCO3, ARTERIAL - RESPIRATORY 22 22 - 28 mmol/L    O2 CONTENT, ARTERIAL - RESPIRATORY 15 15 - 23 %    PCO2, ARTERIAL - RESPIRATORY 37 35 - 48 mm Hg    PH, ARTERIAL - RESPIRATORY 7.38 7.35 - 7.45 Units    PO2, ARTERIAL - RESPIRATORY 218 (H) 83 - 108 mm Hg    O2 SATURATION, ARTERIAL - RESPIRATORY 100 (H) 95 - 99 %    CONDITION - RESPIRATORY ;SIMV RR 12  80% PEEP 8 PS 10 RICHIE 40 PPM     CARBOXYHEMOGLOBIN - RESPIRATORY 1.4 (L) 1.5 - 15.0 %    HEMOGLOBIN - RESPIRATORY 10.2 (L) 13.0 - 17.0 g/dL    METHEMOGLOBIN - RESPIRATORY 1.1 <=1.6 %    OXYHEMOGLOBIN, ARTERIAL - RESPIRATORY 97.4 94.0 - 98.0 %    P/F RATIO - RESPIRATORY 276 (L) 300 - 500    SITE - RESPIRATORY Arterial Line     TEMPERATURE - RESPIRATORY 36.4 degrees   POTASSIUM - RESPIRATORY    Collection Time: 06/04/21  6:05 PM   Result Value Ref Range    POTASSIUM - RESPIRATORY 4.0 3.4 - 5.1 mmol/L   SODIUM -  RESPIRATORY    Collection Time: 06/04/21  6:05 PM   Result Value Ref Range    SODIUM - RESPIRATORY 132 (L) 135 - 145 mmol/L   CALCIUM, IONIZED - RESPIRATORY    Collection Time: 06/04/21  6:05 PM   Result Value Ref Range    CALCIUM, IONIZED - RESPIRATORY 1.13 (L) 1.15 - 1.29 mmol/L   LACTIC ACID, ARTERIAL - RESPIRATORY    Collection Time: 06/04/21  6:05 PM   Result Value Ref Range    LACTIC ACID, ARTERIAL - RESPIRATORY 2.6 (HH) <1.6 mmol/L   Prepare Platelets: 2 Units    Collection Time: 06/04/21  6:11 PM   Result Value Ref Range    UNIT BLOOD TYPE A Pos     ISBT BLOOD TYPE 6200     BLOOD EXPIRATION DATE [id number]     UNIT NUMBER [id number]     DISPENSE STATUS Issued     PRODUCT ID Platelets     PRODUCT CODE [id number]     PRODUCT DESCRIPTION SDP ACD-A>PAS-C LR     ISSUE DATE/TIME [id number]     UNIT BLOOD TYPE A Pos     ISBT BLOOD TYPE 6200     BLOOD EXPIRATION DATE [id number]     UNIT NUMBER [id number]     DISPENSE STATUS Issued     PRODUCT ID Platelets     PRODUCT CODE [id number]     PRODUCT DESCRIPTION SDP ACD-A LR     ISSUE DATE/TIME [id number]    GLUCOSE, BEDSIDE - POINT OF CARE    Collection Time: 06/04/21  6:57 PM   Result Value Ref Range    GLUCOSE, BEDSIDE - POINT OF CARE 163 (H) 70 - 99 mg/dL   Staphylococcus aureus Methicillin Resistant (MRSA) PCR    Collection Time: 06/04/21  7:55 PM    Specimen: Nares; Swab   Result Value Ref Range    MRSA PCR Not Detected Not Detected   Potassium    Collection Time: 06/04/21  7:55 PM   Result Value Ref Range    Potassium 4.1 3.4 - 5.1 mmol/L   Magnesium    Collection Time: 06/04/21  7:55 PM   Result Value Ref Range    Magnesium 1.9 1.7 - 2.4 mg/dL   Hemoglobin and Hematocrit    Collection Time: 06/04/21  7:55 PM   Result Value Ref Range    HGB 9.6 (L) 13.0 - 17.0 g/dL    HCT 28.2 (L) 39.0 - 51.0 %   GLUCOSE, BEDSIDE - POINT OF CARE    Collection Time: 06/04/21  7:58 PM   Result Value Ref Range    GLUCOSE, BEDSIDE - POINT OF CARE 150 (H) 70 - 99 mg/dL    GLUCOSE, BEDSIDE - POINT OF CARE    Collection Time: 06/04/21  9:10 PM   Result Value Ref Range    GLUCOSE, BEDSIDE - POINT OF CARE 145 (H) 70 - 99 mg/dL   GLUCOSE, BEDSIDE - POINT OF CARE    Collection Time: 06/04/21 10:02 PM   Result Value Ref Range    GLUCOSE, BEDSIDE - POINT OF CARE 125 (H) 70 - 99 mg/dL   BLOOD GAS, ARTERIAL WITH COOXIMETRY - RESPIRATORY    Collection Time: 06/04/21 10:13 PM   Result Value Ref Range    BASE EXCESS / DEFICIT, ARTERIAL - RESPIRATORY -1 -2 - 3 mmol/L    HCO3, ARTERIAL - RESPIRATORY 24 22 - 28 mmol/L    O2 CONTENT, ARTERIAL - RESPIRATORY 14 (L) 15 - 23 %    PCO2, ARTERIAL - RESPIRATORY 37 35 - 48 mm Hg    PH, ARTERIAL - RESPIRATORY 7.41 7.35 - 7.45 Units    PO2, ARTERIAL - RESPIRATORY 142 (H) 83 - 108 mm Hg    O2 SATURATION, ARTERIAL - RESPIRATORY 100 (H) 95 - 99 %    CONDITION - RESPIRATORY SIMV 12RR 700VT 60% +8 ps10 toni 40ppm     CARBOXYHEMOGLOBIN - RESPIRATORY 1.2 (L) 1.5 - 15.0 %    HEMOGLOBIN - RESPIRATORY 9.8 (L) 13.0 - 17.0 g/dL    METHEMOGLOBIN - RESPIRATORY 1.3 <=1.6 %    OXYHEMOGLOBIN, ARTERIAL - RESPIRATORY 97.3 94.0 - 98.0 %    P/F RATIO - RESPIRATORY 237 (L) 300 - 500    SITE - RESPIRATORY Arterial Line     TEMPERATURE - RESPIRATORY 37.0 degrees   POTASSIUM - RESPIRATORY    Collection Time: 06/04/21 10:13 PM   Result Value Ref Range    POTASSIUM - RESPIRATORY 4.1 3.4 - 5.1 mmol/L   SODIUM - RESPIRATORY    Collection Time: 06/04/21 10:13 PM   Result Value Ref Range    SODIUM - RESPIRATORY 133 (L) 135 - 145 mmol/L   CALCIUM, IONIZED - RESPIRATORY    Collection Time: 06/04/21 10:13 PM   Result Value Ref Range    CALCIUM, IONIZED - RESPIRATORY 1.20 1.15 - 1.29 mmol/L   LACTIC ACID, ARTERIAL - RESPIRATORY    Collection Time: 06/04/21 10:13 PM   Result Value Ref Range    LACTIC ACID, ARTERIAL - RESPIRATORY 1.9 (H) <1.6 mmol/L   GLUCOSE, BEDSIDE - POINT OF CARE    Collection Time: 06/04/21 11:02 PM   Result Value Ref Range    GLUCOSE, BEDSIDE - POINT OF CARE 128 (H) 70 -  99 mg/dL   Magnesium    Collection Time: 06/05/21  3:03 AM   Result Value Ref Range    Magnesium 2.4 1.7 - 2.4 mg/dL   Comprehensive Metabolic Panel    Collection Time: 06/05/21  3:03 AM   Result Value Ref Range    Fasting Status      Sodium 136 135 - 145 mmol/L    Potassium 4.0 3.4 - 5.1 mmol/L    Chloride 107 98 - 107 mmol/L    Carbon Dioxide 24 21 - 32 mmol/L    Anion Gap 9 (L) 10 - 20 mmol/L    Glucose 95 65 - 99 mg/dL    BUN 18 6 - 20 mg/dL    Creatinine 1.13 0.67 - 1.17 mg/dL    Glomerular Filtration Rate 88 (L) >90 mL/min/1.73m2    BUN/ Creatinine Ratio 16 7 - 25    Calcium 7.9 (L) 8.4 - 10.2 mg/dL    Bilirubin, Total 1.5 (H) 0.2 - 1.0 mg/dL    GOT/ (H) <=37 Units/L    GPT/ALT 48 <64 Units/L    Alkaline Phosphatase 36 (L) 45 - 117 Units/L    Albumin 3.1 (L) 3.6 - 5.1 g/dL    Protein, Total 5.4 (L) 6.4 - 8.2 g/dL    Globulin 2.3 2.0 - 4.0 g/dL    A/G Ratio 1.3 1.0 - 2.4   Prothrombin Time    Collection Time: 06/05/21  3:03 AM   Result Value Ref Range    Prothrombin Time 11.8 9.7 - 11.8 sec    INR 1.1     CBC with Automated Differential (performable only)    Collection Time: 06/05/21  3:03 AM   Result Value Ref Range    WBC 9.7 4.2 - 11.0 K/mcL    RBC 2.85 (L) 4.50 - 5.90 mil/mcL    HGB 8.9 (L) 13.0 - 17.0 g/dL    HCT 26.4 (L) 39.0 - 51.0 %    MCV 92.6 78.0 - 100.0 fl    MCH 31.2 26.0 - 34.0 pg    MCHC 33.7 32.0 - 36.5 g/dL    RDW-CV 12.8 11.0 - 15.0 %    RDW-SD 43.2 39.0 - 50.0 fL     140 - 450 K/mcL    NRBC 0 <=0 /100 WBC    Neutrophil, Percent 80 %    Lymphocytes, Percent 7 %    Mono, Percent 12 %    Eosinophils, Percent 0 %    Basophils, Percent 0 %    Immature Granulocytes 1 %    Absolute Neutrophils 7.8 (H) 1.8 - 7.7 K/mcL    Absolute Lymphocytes 0.7 (L) 1.0 - 4.8 K/mcL    Absolute Monocytes 1.2 (H) 0.3 - 0.9 K/mcL    Absolute Eosinophils  0.0 0.0 - 0.5 K/mcL    Absolute Basophils 0.0 0.0 - 0.3 K/mcL    Absolute Immmature Granulocytes 0.1 0.0 - 0.2 K/mcL   Phosphorus    Collection Time:  06/05/21  3:03 AM   Result Value Ref Range    Phosphorus 2.8 2.4 - 4.7 mg/dL   NT proBNP    Collection Time: 06/05/21  3:03 AM   Result Value Ref Range    NT-proBNP 8,129 (H) <=125 pg/mL   Partial Thromboplastin Time    Collection Time: 06/05/21  3:03 AM   Result Value Ref Range    PTT 32 (H) 22 - 30 sec   Fibrinogen Activity    Collection Time: 06/05/21  3:03 AM   Result Value Ref Range    Fibrinogen 333 190 - 425 mg/dL   Lactate Dehydrogenase    Collection Time: 06/05/21  3:03 AM   Result Value Ref Range    LD, Total 473 (H) 86 - 234 Units/L   BLOOD GAS, ARTERIAL WITH COOXIMETRY - RESPIRATORY    Collection Time: 06/05/21  4:34 AM   Result Value Ref Range    BASE EXCESS / DEFICIT, ARTERIAL - RESPIRATORY 1 -2 - 3 mmol/L    HCO3, ARTERIAL - RESPIRATORY 25 22 - 28 mmol/L    O2 CONTENT, ARTERIAL - RESPIRATORY 12 (L) 15 - 23 %    PCO2, ARTERIAL - RESPIRATORY 38 35 - 48 mm Hg    PH, ARTERIAL - RESPIRATORY 7.42 7.35 - 7.45 Units    PO2, ARTERIAL - RESPIRATORY 122 (H) 83 - 108 mm Hg    O2 SATURATION, ARTERIAL - RESPIRATORY 100 (H) 95 - 99 %    CONDITION - RESPIRATORY AC 12RR 700VT 40% %5 toni 40ppm     CARBOXYHEMOGLOBIN - RESPIRATORY 1.5 1.5 - 15.0 %    HEMOGLOBIN - RESPIRATORY 8.8 (L) 13.0 - 17.0 g/dL    METHEMOGLOBIN - RESPIRATORY 1.1 <=1.6 %    OXYHEMOGLOBIN, ARTERIAL - RESPIRATORY 97.4 94.0 - 98.0 %    P/F RATIO - RESPIRATORY 280 (L) 300 - 500    SITE - RESPIRATORY Arterial Line     TEMPERATURE - RESPIRATORY 38.5 degrees   POTASSIUM - RESPIRATORY    Collection Time: 06/05/21  4:34 AM   Result Value Ref Range    POTASSIUM - RESPIRATORY 4.3 3.4 - 5.1 mmol/L   SODIUM - RESPIRATORY    Collection Time: 06/05/21  4:34 AM   Result Value Ref Range    SODIUM - RESPIRATORY 132 (L) 135 - 145 mmol/L   CALCIUM, IONIZED - RESPIRATORY    Collection Time: 06/05/21  4:34 AM   Result Value Ref Range    CALCIUM, IONIZED - RESPIRATORY 1.18 1.15 - 1.29 mmol/L   LACTIC ACID, ARTERIAL - RESPIRATORY    Collection Time: 06/05/21  4:34 AM    Result Value Ref Range    LACTIC ACID, ARTERIAL - RESPIRATORY 1.1 <1.6 mmol/L   BLOOD GAS, MIXED VENOUS WITH COOXIMETRY - RESPIRATORY    Collection Time: 06/05/21  4:37 AM   Result Value Ref Range    CONDITION - RESPIRATORY AC 12RR 700VT 40% +8 toni 40ppm     CONDITION - RESPIRATORY      FIO2 - RESPIRATORY      HEMOGLOBIN - RESPIRATORY 8.7 (L) 13.0 - 17.0 g/dL    BASE EXCESS / DEFICIT, MIXED VENOUS - RESPIRATORY -1 mmol/L    CARBOXYHEMOGLOBIN, MIXED VENOUS - RESPIRATORY 1 %    HCO3, MIXED VENOUS - RESPIRATORY 24 mmol/L    METHEMOGLOBIN - RESPIRATORY 1.0 <=1.6 %    OXYHEMOGLOBIN, MIXED VENOUS - RESPIRATORY 62.9 %    O2 CONTENT, MIXED VENOUS - RESPIRATORY 8 %    PCO2, MIXED VENOUS - RESPIRATORY 43 mm Hg    PH, MIXED VENOUS - RESPIRATORY 7.37 Units    PO2, MIXED VENOUS - RESPIRATORY 43 mm Hg    O2 SATURATION, MIXED VENOUS - RESPIRATORY 64 %    SITE - RESPIRATORY Venous Line     TEMPERATURE - RESPIRATORY 38.5 degrees   POTASSIUM - RESPIRATORY    Collection Time: 06/05/21  4:37 AM   Result Value Ref Range    POTASSIUM - RESPIRATORY 4.4 3.4 - 5.1 mmol/L   SODIUM - RESPIRATORY    Collection Time: 06/05/21  4:37 AM   Result Value Ref Range    SODIUM - RESPIRATORY 137 135 - 145 mmol/L   CALCIUM, IONIZED - RESPIRATORY    Collection Time: 06/05/21  4:37 AM   Result Value Ref Range    CALCIUM, IONIZED - RESPIRATORY 1.21 1.15 - 1.29 mmol/L   Prepare Red Blood Cells: 1 Units    Collection Time: 06/05/21  5:24 AM   Result Value Ref Range    UNIT BLOOD TYPE O Pos     ISBT BLOOD TYPE 5100     BLOOD EXPIRATION DATE [id number]     UNIT NUMBER [id number]     DISPENSE STATUS Issued     PRODUCT ID Red Blood Cells     PRODUCT CODE [id number]     PRODUCT DESCRIPTION RBC AS-3 LR     CROSSMATCH RESULT Compatible     ISSUE DATE/TIME [id number]    Hemoglobin and Hematocrit    Collection Time: 06/05/21  8:47 AM   Result Value Ref Range    HGB 9.1 (L) 13.0 - 17.0 g/dL    HCT 27.1 (L) 39.0 - 51.0 %   Potassium    Collection Time:  06/05/21  8:47 AM   Result Value Ref Range    Potassium 3.7 3.4 - 5.1 mmol/L   Magnesium    Collection Time: 06/05/21  8:47 AM   Result Value Ref Range    Magnesium 2.2 1.7 - 2.4 mg/dL       Imaging, reviewed personally:  Chest x-ray 6/5/2021 reviewed, ET tube in place, lungs clear, right costophrenic angle clear, left costophrenic angle obscured by LVAD left atelectasis, enlarged cardiac silhouette, AICD noted.  Julian-Lexis catheter, lines in place.  Chest tubes in place.

## 2022-07-21 NOTE — PROGRESS NOTES
"   New Patient Complaint      Patient: Bronson Phillips  YOB: 1985 34 y.o. male  Medical Record Number: [id number]    Chief Complaints: I hurt my toe    History of Present Illness: Patient injured his left fifth toe on 4/16/2019 when he was at work and had to jump off of some type of machine to keep from \"being crushed.    He was seen and has been followed elsewhere for his left shoulder and left fifth toe but was told he may need surgery for the foot and was waiting on some type of referral says that he became \"frustrated\" and went to the ER for further evaluation.  He was seen and examined there and referred here today for further evaluation.      He reports a severe constant crushing stabbing aching pain with redness bruising and swelling in his left fifth toe worse with standing driving and walking improved with ice.  He has been using the boot but no other assistive device.       HPI    Allergies: No Known Allergies    Medications:   Current Outpatient Medications on File Prior to Visit   Medication Sig   • albuterol sulfate  (90 Base) MCG/ACT inhaler Inhale 2 puffs Every 4 (Four) Hours As Needed for Wheezing.   • fluticasone-salmeterol (ADVAIR HFA) 115-21 MCG/ACT inhaler Inhale 2 puffs 2 (Two) Times a Day.   • naproxen (EC NAPROSYN) 500 MG EC tablet Take 1 tablet by mouth 2 (Two) Times a Day With Meals.   • traMADol (ULTRAM) 50 MG tablet Take 1 tablet by mouth Every 6 (Six) Hours As Needed for Moderate Pain .     No current facility-administered medications on file prior to visit.        History reviewed. No pertinent past medical history.  Past Surgical History:   Procedure Laterality Date   • SHOULDER ARTHROSCOPY Left      Social History     Occupational History   • Not on file   Tobacco Use   • Smoking status: Current Some Day Smoker     Packs/day: 0.50     Types: Cigarettes   • Smokeless tobacco: Never Used   Substance and Sexual Activity   • Alcohol use: No     Frequency: Never   • Drug " "use: No   • Sexual activity: Defer      Social History     Social History Narrative   • Not on file     History reviewed. No pertinent family history.    Review of Systems: 14 point review of systems performed, positive pertinent findings identified in HPI. All remaining systems negative except wheezing, numbness and tingling, mood swings, anxiety depression, difficulty sleeping    Review of Systems      Physical Exam:   Vitals:    05/09/19 0903   Temp: 98.2 °F (36.8 °C)   Weight: 99.8 kg (220 lb)   Height: 182.9 cm (72\")     Physical Exam   Constitutional: pleasant, well developed   Eyes: sclera non icteric  Hearing : adequate for exam  Cardiovascular: palpable pulses in left foot, left calf/ thigh NT without sign of DVT  Respiratoy: breathing unlabored   Neurological: grossly sensate to LT throughout left LE  Psychiatric: oriented with normal mood and affect.   Lymphatic: No palpable popliteal lymphadenopathy left LE  Skin: intact throughout left leg/foot  Musculoskeletal: Left foot shows mild to moderate swelling of the left fifth toe with some palpable prominence dorsally with exquisite tenderness to palpation.  There is brisk capillary refill to the toe but it was grossly sensate to light touch.  He is nontender over the ankle and was ablating in a boot without assistive device.  Physical Exam  Ortho Exam    Radiology: 3 views of the left fifth toe ordered to evaluate pain reviewed and compared to x-rays on the [employer] system from 5/7/2019.  There is a transverse slightly oblique fracture through the midportion of the left fifth proximal phalanx.  There is not appreciable angulation but there is dorsal translation of about 80% the diameter of the phalanx.    Assessment/Plan: 1.  Displaced fracture left fifth toe proximal phalanx.    Reviewed with him operative and nonoperative treatment options and given the fact that is been about 3 and half weeks since his injury he has not had any improvement in pain he " would like to proceed with operative treatment although no guarantees could be given.  Reviewed with him that this could potentially heal without surgery but given the amount of translation there may be persistent residual deformity or it may not heal.  Certainly could not guarantee with surgery that he would be pain-free and reviewed with him that given the length of time since this injury that this would require open treatment rather than closed and percutaneous pinning.    I reviewed the operative procedure and postoperative course with him and that he will need to be limited in weightbearing with at least use of a cane in the right hand as he cannot use crutches because of some type of injury in his left shoulder that he is being seen elsewhere for.    Risk benefits potential outcomes and complications were reviewed and can include but not limited to heart attack stroke death pneumonia infection bleeding damage to blood vessels nerves or tendons blood clots pulmonary embolism persistent or worsening pain stiffness malunion nonunion and loss of the toe.  All his questions answered to his full satisfaction we will plan to proceed with this on outpatient basis tomorrow.    Regarding persistent habitual smoking.  I have discussed for at least 3 minutes with the patient the ill effects of continued smoking on pulmonary and cardiovascular health as well as financial burden.  I also discussed at length the effects on peripheral circulation as well as inhibition of soft tissue and bone healing.  I've recommended that they speak with their primary care physician regarding cessation techniques.      This will be done under his regular insurance although there is a Workmen's Compensation claim it was investigated by our office staff and the claim is being denied and patient has been terminated.  He was made aware of all this during his visit   Detail Level: Zone Prep: The treated area was degreased with pre-peel cleanser, and vaseline was applied for protection of mucous membranes. Number Of Coats: 3 Frost (0,1+,2+,3+,4+): 1+ Consent: Prior to the procedure, written consent was obtained and risks were reviewed, including but not limited to: redness, peeling, blistering, pigmentary change, scarring, infection, and pain. Post-Care Instructions: I reviewed with the patient in detail post-care instructions. Patient should avoid sun exposure and wear sun protection. Time (Mins): 2 Treatment Number: 0 Post Peel Care: After the procedure, the treatment area was washed with soap and water, and a post peel cream was applied Skin protection and post-care instructions were reviewed with the patient by TS Chemical Peel: 17.5% TCA Erythema: mild

## (undated) DEVICE — SKIN PREP TRAY W/CHG: Brand: MEDLINE INDUSTRIES, INC.

## (undated) DEVICE — SUT ETHLN 4/0 PS2 PLSTC 1667G

## (undated) DEVICE — BALL PIN JURGAN .062 GRN

## (undated) DEVICE — BNDG GZ SOF-FORM CONFRM 2X75IN LF STRL

## (undated) DEVICE — DRSNG GZ PETROLTM XEROFORM CURAD 1X8IN STRL

## (undated) DEVICE — GLV SURG TRIUMPH CLASSIC PF LTX 8 STRL

## (undated) DEVICE — ELECTRD NDL EZ CLN MOD 2.75IN

## (undated) DEVICE — WEBRIL* CAST PADDING: Brand: DEROYAL

## (undated) DEVICE — STPLR SKIN VISISTAT WD 35CT

## (undated) DEVICE — SHOE P/OP/CAST O/T M MD 9/11

## (undated) DEVICE — BNDG ELAS CO-FLEX SLF ADHR 2IN 5YD LF STRL

## (undated) DEVICE — PK ORTHO MINOR TOWER 40

## (undated) DEVICE — SUT VIC 4/0 RB1 27IN J214H

## (undated) DEVICE — APPL CHLORAPREP W/TINT 26ML ORNG

## (undated) DEVICE — DISPOSABLE TOURNIQUET CUFF SINGLE BLADDER, SINGLE PORT AND QUICK CONNECT CONNECTOR: Brand: COLOR CUFF

## (undated) DEVICE — GLV SURG BIOGEL LTX PF 8

## (undated) DEVICE — DRP C/ARM 41X74IN

## (undated) DEVICE — GOWN,NON-REINFORCED,SIRUS,SET IN SLV,XXL: Brand: MEDLINE

## (undated) DEVICE — UNDERCAST PADDING: Brand: DEROYAL